# Patient Record
Sex: MALE | Race: OTHER | Employment: OTHER | URBAN - METROPOLITAN AREA
[De-identification: names, ages, dates, MRNs, and addresses within clinical notes are randomized per-mention and may not be internally consistent; named-entity substitution may affect disease eponyms.]

---

## 2018-08-09 ENCOUNTER — OFFICE VISIT (OUTPATIENT)
Dept: INTERNAL MEDICINE CLINIC | Facility: CLINIC | Age: 78
End: 2018-08-09
Payer: MEDICARE

## 2018-08-09 VITALS
RESPIRATION RATE: 20 BRPM | WEIGHT: 129.31 LBS | HEIGHT: 68 IN | HEART RATE: 68 BPM | TEMPERATURE: 98 F | DIASTOLIC BLOOD PRESSURE: 68 MMHG | BODY MASS INDEX: 19.6 KG/M2 | SYSTOLIC BLOOD PRESSURE: 118 MMHG

## 2018-08-09 DIAGNOSIS — I10 ESSENTIAL HYPERTENSION: Primary | ICD-10-CM

## 2018-08-09 DIAGNOSIS — K51.90 ULCERATIVE COLITIS WITHOUT COMPLICATIONS, UNSPECIFIED LOCATION (HCC): ICD-10-CM

## 2018-08-09 DIAGNOSIS — Z12.5 SCREENING FOR PROSTATE CANCER: ICD-10-CM

## 2018-08-09 DIAGNOSIS — E78.5 HYPERLIPIDEMIA, UNSPECIFIED HYPERLIPIDEMIA TYPE: ICD-10-CM

## 2018-08-09 PROCEDURE — 99203 OFFICE O/P NEW LOW 30 MIN: CPT | Performed by: INTERNAL MEDICINE

## 2018-08-09 PROCEDURE — G0463 HOSPITAL OUTPT CLINIC VISIT: HCPCS | Performed by: INTERNAL MEDICINE

## 2018-08-09 RX ORDER — LOSARTAN POTASSIUM AND HYDROCHLOROTHIAZIDE 12.5; 5 MG/1; MG/1
1 TABLET ORAL DAILY
Qty: 90 TABLET | Refills: 1 | Status: SHIPPED | OUTPATIENT
Start: 2018-08-09 | End: 2019-11-20

## 2018-08-09 RX ORDER — ATORVASTATIN CALCIUM 40 MG/1
TABLET, FILM COATED ORAL
COMMUNITY
Start: 2018-06-12 | End: 2018-08-09

## 2018-08-09 RX ORDER — FINASTERIDE 5 MG/1
5 TABLET, FILM COATED ORAL DAILY
COMMUNITY
Start: 2018-06-15 | End: 2018-08-09 | Stop reason: ALTCHOICE

## 2018-08-09 RX ORDER — GABAPENTIN 100 MG/1
100 CAPSULE ORAL 3 TIMES DAILY
COMMUNITY
Start: 2018-05-29

## 2018-08-09 RX ORDER — ROSUVASTATIN CALCIUM 10 MG/1
10 TABLET, COATED ORAL DAILY
COMMUNITY
End: 2019-11-26

## 2018-08-09 RX ORDER — MESALAMINE 500 MG/1
1000 CAPSULE ORAL 3 TIMES DAILY
COMMUNITY
Start: 2018-06-18 | End: 2018-12-13

## 2018-08-09 RX ORDER — LOSARTAN POTASSIUM AND HYDROCHLOROTHIAZIDE 12.5; 5 MG/1; MG/1
1 TABLET ORAL DAILY
COMMUNITY
Start: 2018-05-20 | End: 2018-08-09

## 2018-08-09 RX ORDER — ZOLPIDEM TARTRATE 5 MG/1
5 TABLET ORAL NIGHTLY PRN
COMMUNITY
Start: 2018-05-28 | End: 2018-08-09 | Stop reason: SINTOL

## 2018-08-09 RX ORDER — CLOBETASOL PROPIONATE 0.5 MG/G
1 OINTMENT TOPICAL 2 TIMES DAILY
COMMUNITY
Start: 2018-06-19

## 2018-08-09 RX ORDER — TEMAZEPAM 7.5 MG/1
7.5 CAPSULE ORAL NIGHTLY PRN
Qty: 30 CAPSULE | Refills: 0 | Status: SHIPPED | OUTPATIENT
Start: 2018-08-09 | End: 2019-03-07

## 2018-08-09 NOTE — PROGRESS NOTES
HPI:    Patient ID: Kelli Sunshine is a 78year old male. HPI  Patient here to establish care with new primary care physician. Recently moved to the area from [facility]. His son is a medical resident at [facility], [place], operated by [facility].   Patient has underlying ulcerative dysuria, hematuria and sexual dysfunction. Musculoskeletal: Negative for joint pain. Skin: Negative for rash. Neurological: Positive for weakness. Negative for numbness and headaches. Hematological: Does not bruise/bleed easily.    Psychiatric/Behav ASSESSMENT/PLAN:   (I10) Essential hypertension  (primary encounter diagnosis)  Plan: CBC WITH DIFFERENTIAL WITH PLATELET, COMP         METABOLIC PANEL (14), LIPID PANEL, VITAMIN B12,        ASSAY, THYROID STIM HORMONE         Blood pressure appears well

## 2018-08-10 ENCOUNTER — LAB ENCOUNTER (OUTPATIENT)
Dept: LAB | Facility: HOSPITAL | Age: 78
End: 2018-08-10
Attending: INTERNAL MEDICINE
Payer: MEDICARE

## 2018-08-10 DIAGNOSIS — Z12.5 SCREENING FOR PROSTATE CANCER: ICD-10-CM

## 2018-08-10 DIAGNOSIS — I10 ESSENTIAL HYPERTENSION: ICD-10-CM

## 2018-08-10 LAB
ALBUMIN SERPL BCP-MCNC: 4 G/DL (ref 3.5–4.8)
ALBUMIN/GLOB SERPL: 1.1 {RATIO} (ref 1–2)
ALP SERPL-CCNC: 86 U/L (ref 32–100)
ALT SERPL-CCNC: 16 U/L (ref 17–63)
ANION GAP SERPL CALC-SCNC: 7 MMOL/L (ref 0–18)
AST SERPL-CCNC: 23 U/L (ref 15–41)
BASOPHILS # BLD: 0.1 K/UL (ref 0–0.2)
BASOPHILS NFR BLD: 1 %
BILIRUB SERPL-MCNC: 0.9 MG/DL (ref 0.3–1.2)
BUN SERPL-MCNC: 16 MG/DL (ref 8–20)
BUN/CREAT SERPL: 17.8 (ref 10–20)
CALCIUM SERPL-MCNC: 9.1 MG/DL (ref 8.5–10.5)
CHLORIDE SERPL-SCNC: 105 MMOL/L (ref 95–110)
CHOLEST SERPL-MCNC: 144 MG/DL (ref 110–200)
CO2 SERPL-SCNC: 28 MMOL/L (ref 22–32)
CREAT SERPL-MCNC: 0.9 MG/DL (ref 0.5–1.5)
EOSINOPHIL # BLD: 0.4 K/UL (ref 0–0.7)
EOSINOPHIL NFR BLD: 4 %
ERYTHROCYTE [DISTWIDTH] IN BLOOD BY AUTOMATED COUNT: 13.7 % (ref 11–15)
GLOBULIN PLAS-MCNC: 3.5 G/DL (ref 2.5–3.7)
GLUCOSE SERPL-MCNC: 116 MG/DL (ref 70–99)
HCT VFR BLD AUTO: 42.4 % (ref 41–52)
HDLC SERPL-MCNC: 37 MG/DL
HGB BLD-MCNC: 14.1 G/DL (ref 13.5–17.5)
LDLC SERPL CALC-MCNC: 84 MG/DL (ref 0–99)
LYMPHOCYTES # BLD: 1.3 K/UL (ref 1–4)
LYMPHOCYTES NFR BLD: 13 %
MCH RBC QN AUTO: 29.2 PG (ref 27–32)
MCHC RBC AUTO-ENTMCNC: 33.2 G/DL (ref 32–37)
MCV RBC AUTO: 87.7 FL (ref 80–100)
MONOCYTES # BLD: 1 K/UL (ref 0–1)
MONOCYTES NFR BLD: 10 %
NEUTROPHILS # BLD AUTO: 7.1 K/UL (ref 1.8–7.7)
NEUTROPHILS NFR BLD: 72 %
NONHDLC SERPL-MCNC: 107 MG/DL
OSMOLALITY UR CALC.SUM OF ELEC: 292 MOSM/KG (ref 275–295)
PATIENT FASTING: YES
PLATELET # BLD AUTO: 296 K/UL (ref 140–400)
PMV BLD AUTO: 9 FL (ref 7.4–10.3)
POTASSIUM SERPL-SCNC: 4 MMOL/L (ref 3.3–5.1)
PROT SERPL-MCNC: 7.5 G/DL (ref 5.9–8.4)
PSA SERPL-MCNC: 1.4 NG/ML (ref 0–4)
RBC # BLD AUTO: 4.83 M/UL (ref 4.5–5.9)
SODIUM SERPL-SCNC: 140 MMOL/L (ref 136–144)
TRIGL SERPL-MCNC: 115 MG/DL (ref 1–149)
TSH SERPL-ACNC: 1.9 UIU/ML (ref 0.45–5.33)
VIT B12 SERPL-MCNC: 166 PG/ML (ref 181–914)
WBC # BLD AUTO: 9.9 K/UL (ref 4–11)

## 2018-08-10 PROCEDURE — 80053 COMPREHEN METABOLIC PANEL: CPT

## 2018-08-10 PROCEDURE — 36415 COLL VENOUS BLD VENIPUNCTURE: CPT

## 2018-08-10 PROCEDURE — 82607 VITAMIN B-12: CPT

## 2018-08-10 PROCEDURE — 84443 ASSAY THYROID STIM HORMONE: CPT

## 2018-08-10 PROCEDURE — 85025 COMPLETE CBC W/AUTO DIFF WBC: CPT

## 2018-08-10 PROCEDURE — 80061 LIPID PANEL: CPT

## 2018-12-13 ENCOUNTER — OFFICE VISIT (OUTPATIENT)
Dept: GASTROENTEROLOGY | Facility: CLINIC | Age: 78
End: 2018-12-13
Payer: COMMERCIAL

## 2018-12-13 VITALS
WEIGHT: 122.38 LBS | BODY MASS INDEX: 18.55 KG/M2 | DIASTOLIC BLOOD PRESSURE: 77 MMHG | HEART RATE: 91 BPM | HEIGHT: 68 IN | SYSTOLIC BLOOD PRESSURE: 126 MMHG

## 2018-12-13 DIAGNOSIS — K51.918 ULCERATIVE COLITIS WITH OTHER COMPLICATION, UNSPECIFIED LOCATION (HCC): Primary | ICD-10-CM

## 2018-12-13 PROCEDURE — 99213 OFFICE O/P EST LOW 20 MIN: CPT | Performed by: INTERNAL MEDICINE

## 2018-12-13 PROCEDURE — G0463 HOSPITAL OUTPT CLINIC VISIT: HCPCS | Performed by: INTERNAL MEDICINE

## 2018-12-13 RX ORDER — FLUOCINOLONE ACETONIDE 0.1 MG/ML
SOLUTION TOPICAL
COMMUNITY
Start: 2018-12-10

## 2018-12-13 RX ORDER — MESALAMINE 500 MG/1
1000 CAPSULE ORAL 3 TIMES DAILY
Qty: 180 CAPSULE | Refills: 11 | Status: SHIPPED | OUTPATIENT
Start: 2018-12-13 | End: 2019-12-28

## 2018-12-13 NOTE — PATIENT INSTRUCTIONS
Ulcerative colitis  - colonoscopy with colyte prep and MAC  - continue on  Pentasa   - follow up with me annually  - flu shot annually  - avoid ibuprofen

## 2018-12-14 ENCOUNTER — TELEPHONE (OUTPATIENT)
Dept: GASTROENTEROLOGY | Facility: CLINIC | Age: 78
End: 2018-12-14

## 2018-12-14 DIAGNOSIS — K51.919 ACUTE ULCERATIVE COLITIS WITH COMPLICATION (HCC): Primary | ICD-10-CM

## 2018-12-14 NOTE — TELEPHONE ENCOUNTER
Scheduled for:  Colonoscopy 44674  Provider Name:   Date:  3/18/19   Location:  [facility]  Sedation:  Mac  Time:  0945 / Arrival 0845  Prep: Split dose Colyte  Meds/Allergies Reconciled?:  Physician reviewed  Diagnosis with codes:  Ulcerative Colitis K51

## 2018-12-21 NOTE — PROGRESS NOTES
Brady Dean is a 78year old male. HPI:   Patient presents with:  Consult  Ulcerative Colitis  Colonoscopy Screening      The patient is a 60-year-old female who has a history of ulcerative colitis.   She reports that she is been in remission and on a st (55.5 kg).     The patient appears their stated age and is in no acute distress  HEENT- anicteric sclera, neck no lymphadnopathy, OP- clear with no masses or lesions  Chest- Clear bilaterally, no wheezing,  Heart- regular rate, no murmur or gallop  Abdomen-

## 2019-01-01 ENCOUNTER — APPOINTMENT (OUTPATIENT)
Dept: GENERAL RADIOLOGY | Facility: HOSPITAL | Age: 79
End: 2019-01-01
Attending: EMERGENCY MEDICINE
Payer: MEDICARE

## 2019-01-01 ENCOUNTER — HOSPITAL ENCOUNTER (EMERGENCY)
Facility: HOSPITAL | Age: 79
Discharge: HOME | End: 2019-01-01
Attending: EMERGENCY MEDICINE
Payer: MEDICARE

## 2019-01-01 VITALS
TEMPERATURE: 97 F | RESPIRATION RATE: 20 BRPM | WEIGHT: 125 LBS | HEIGHT: 69 IN | DIASTOLIC BLOOD PRESSURE: 70 MMHG | OXYGEN SATURATION: 95 % | BODY MASS INDEX: 18.51 KG/M2 | SYSTOLIC BLOOD PRESSURE: 137 MMHG | HEART RATE: 100 BPM

## 2019-01-01 DIAGNOSIS — J18.9 COMMUNITY ACQUIRED PNEUMONIA OF LEFT LOWER LOBE OF LUNG: Primary | ICD-10-CM

## 2019-01-01 DIAGNOSIS — J98.01 ACUTE BRONCHOSPASM: ICD-10-CM

## 2019-01-01 LAB
ANION GAP SERPL CALC-SCNC: 9 MMOL/L (ref 0–18)
BASOPHILS # BLD: 0.1 K/UL (ref 0–0.2)
BASOPHILS NFR BLD: 1 %
BUN SERPL-MCNC: 19 MG/DL (ref 8–20)
BUN/CREAT SERPL: 22.4 (ref 10–20)
CALCIUM SERPL-MCNC: 8.2 MG/DL (ref 8.5–10.5)
CHLORIDE SERPL-SCNC: 103 MMOL/L (ref 95–110)
CO2 SERPL-SCNC: 24 MMOL/L (ref 22–32)
CREAT SERPL-MCNC: 0.85 MG/DL (ref 0.5–1.5)
EOSINOPHIL # BLD: 0.1 K/UL (ref 0–0.7)
EOSINOPHIL NFR BLD: 1 %
ERYTHROCYTE [DISTWIDTH] IN BLOOD BY AUTOMATED COUNT: 14.2 % (ref 11–15)
GLUCOSE SERPL-MCNC: 130 MG/DL (ref 70–99)
HCT VFR BLD AUTO: 39.9 % (ref 41–52)
HGB BLD-MCNC: 13 G/DL (ref 13.5–17.5)
LYMPHOCYTES # BLD: 1 K/UL (ref 1–4)
LYMPHOCYTES NFR BLD: 8 %
MCH RBC QN AUTO: 28.2 PG (ref 27–32)
MCHC RBC AUTO-ENTMCNC: 32.5 G/DL (ref 32–37)
MCV RBC AUTO: 86.8 FL (ref 80–100)
MONOCYTES # BLD: 1.8 K/UL (ref 0–1)
MONOCYTES NFR BLD: 14 %
NEUTROPHILS # BLD AUTO: 10 K/UL (ref 1.8–7.7)
NEUTROPHILS NFR BLD: 77 %
OSMOLALITY UR CALC.SUM OF ELEC: 286 MOSM/KG (ref 275–295)
PLATELET # BLD AUTO: 337 K/UL (ref 140–400)
PMV BLD AUTO: 8.7 FL (ref 7.4–10.3)
POTASSIUM SERPL-SCNC: 3.4 MMOL/L (ref 3.3–5.1)
RBC # BLD AUTO: 4.6 M/UL (ref 4.5–5.9)
SODIUM SERPL-SCNC: 136 MMOL/L (ref 136–144)
WBC # BLD AUTO: 13 K/UL (ref 4–11)

## 2019-01-01 PROCEDURE — 71046 X-RAY EXAM CHEST 2 VIEWS: CPT | Performed by: EMERGENCY MEDICINE

## 2019-01-01 PROCEDURE — 94640 AIRWAY INHALATION TREATMENT: CPT

## 2019-01-01 PROCEDURE — 85025 COMPLETE CBC W/AUTO DIFF WBC: CPT | Performed by: EMERGENCY MEDICINE

## 2019-01-01 PROCEDURE — 80048 BASIC METABOLIC PNL TOTAL CA: CPT | Performed by: EMERGENCY MEDICINE

## 2019-01-01 PROCEDURE — 96365 THER/PROPH/DIAG IV INF INIT: CPT

## 2019-01-01 PROCEDURE — 99285 EMERGENCY DEPT VISIT HI MDM: CPT

## 2019-01-01 RX ORDER — ALBUTEROL SULFATE 90 UG/1
2 AEROSOL, METERED RESPIRATORY (INHALATION) EVERY 4 HOURS PRN
Qty: 1 INHALER | Refills: 0 | Status: SHIPPED | OUTPATIENT
Start: 2019-01-01 | End: 2019-01-31

## 2019-01-01 RX ORDER — AZITHROMYCIN 250 MG/1
TABLET, FILM COATED ORAL
Qty: 1 PACKAGE | Refills: 0 | Status: SHIPPED | OUTPATIENT
Start: 2019-01-01 | End: 2019-03-07 | Stop reason: ALTCHOICE

## 2019-01-01 RX ORDER — IPRATROPIUM BROMIDE AND ALBUTEROL SULFATE 2.5; .5 MG/3ML; MG/3ML
3 SOLUTION RESPIRATORY (INHALATION) ONCE
Status: COMPLETED | OUTPATIENT
Start: 2019-01-01 | End: 2019-01-01

## 2019-01-01 NOTE — ED NOTES
Verbalized understanding of d/c instructions and follow-up information. Given copy of d/c papers and prescription.

## 2019-01-01 NOTE — ED PROVIDER NOTES
Patient Seen in: [facility] AND [facility] Emergency Department    History   Patient presents with:  Cough/URI    Stated Complaint: cough    HPI    The patient is a 72-year-old male who presents with coarse cough chest tightness and some shortness of breath for distension. There is no tenderness. Musculoskeletal: Normal range of motion. He exhibits no edema or tenderness. Neurological: He is alert and oriented to person, place, and time. He has normal reflexes. Skin: Skin is warm and dry.  Capillary refill t mild tachycardia    Radiology findings: Xr Chest Pa + Lat Chest (cpt=71046)    Result Date: 1/1/2019  CONCLUSION:  1. Left lower lobe pulmonary opacity suspicious for pneumonia/infiltrate.   Followup imaging with chest radiographs in 1 month is recommended

## 2019-01-04 ENCOUNTER — OFFICE VISIT (OUTPATIENT)
Dept: INTERNAL MEDICINE CLINIC | Facility: CLINIC | Age: 79
End: 2019-01-04
Payer: COMMERCIAL

## 2019-01-04 VITALS
SYSTOLIC BLOOD PRESSURE: 146 MMHG | TEMPERATURE: 98 F | WEIGHT: 121.5 LBS | HEART RATE: 92 BPM | DIASTOLIC BLOOD PRESSURE: 66 MMHG | HEIGHT: 68 IN | RESPIRATION RATE: 18 BRPM | BODY MASS INDEX: 18.41 KG/M2

## 2019-01-04 DIAGNOSIS — J18.9 PNEUMONIA OF LEFT LOWER LOBE DUE TO INFECTIOUS ORGANISM: Primary | ICD-10-CM

## 2019-01-04 PROCEDURE — G0463 HOSPITAL OUTPT CLINIC VISIT: HCPCS | Performed by: INTERNAL MEDICINE

## 2019-01-04 PROCEDURE — 99213 OFFICE O/P EST LOW 20 MIN: CPT | Performed by: INTERNAL MEDICINE

## 2019-01-04 RX ORDER — METHYLPREDNISOLONE 4 MG/1
TABLET ORAL
COMMUNITY
Start: 2018-12-24 | End: 2019-01-04 | Stop reason: ALTCHOICE

## 2019-01-04 RX ORDER — OSELTAMIVIR PHOSPHATE 75 MG/1
CAPSULE ORAL
COMMUNITY
Start: 2018-12-24 | End: 2019-03-07 | Stop reason: ALTCHOICE

## 2019-01-04 RX ORDER — AMOXICILLIN AND CLAVULANATE POTASSIUM 500; 125 MG/1; MG/1
TABLET, FILM COATED ORAL
COMMUNITY
Start: 2018-12-16 | End: 2019-01-04 | Stop reason: ALTCHOICE

## 2019-01-04 NOTE — PROGRESS NOTES
HPI:    Patient ID: Marlene Ann is a 70year old male. HPI  Patient is here for follow-up on recent emergency room visit. Patient started feeling ill on December 10. He was in [place] before that.   When he got off the plane back in the [facility], Oseltamivir Phosphate 75 MG Oral Cap  Disp:  Rfl:      Allergies:No Known Allergies   PHYSICAL EXAM:   Physical Exam    Constitutional: He appears well-developed and well-nourished.    HENT:   Nose: Nose normal.   Mouth/Throat: Oropharynx is clear and anika

## 2019-01-24 ENCOUNTER — TELEPHONE (OUTPATIENT)
Dept: GASTROENTEROLOGY | Facility: CLINIC | Age: 79
End: 2019-01-24

## 2019-01-24 DIAGNOSIS — K51.90 MILD CHRONIC ULCERATIVE COLITIS WITHOUT COMPLICATION (HCC): Primary | ICD-10-CM

## 2019-01-24 NOTE — TELEPHONE ENCOUNTER
Left message on below voicemail stating message received, I will cancel his colonoscopy and forward message to our schedulers to reschedule. If they are unable to reach him before 1/28, he can call us back when he returns in June. Cancelled in epic.  Case r

## 2019-01-24 NOTE — TELEPHONE ENCOUNTER
Pt called back, informed of below. He is not going out of the country, will be in [facility] [place], so will have cell to receive call from schedulers. See below. Informed message already sent to schedulers.

## 2019-01-24 NOTE — TELEPHONE ENCOUNTER
Pt cancelling CLN artur 3/18/19, pt will be out of town, pt requesting to casimiro for 6/10/19 or after, pt informed about the 72 hr cb, pls call at:[phone],thanks.   *Pt leaving 1/28/19, call before if poss, if not pt will cb after 72 hr

## 2019-01-25 NOTE — TELEPHONE ENCOUNTER
Scheduled for:  Colonoscopy 08283  Provider Name: Dr. Yasmeen Escobar  Date:  6/17/19  Location:  [facility]  Sedation:  MAC  Time:   0900 (pt is aware to arrive at 0800)   Prep:  Juan, mailed 1/28/19  Meds/Allergies Reconciled?:  Physician reviewed   Diagnosis with code

## 2019-03-07 ENCOUNTER — OFFICE VISIT (OUTPATIENT)
Dept: INTERNAL MEDICINE CLINIC | Facility: CLINIC | Age: 79
End: 2019-03-07
Payer: COMMERCIAL

## 2019-03-07 VITALS
TEMPERATURE: 99 F | HEIGHT: 68 IN | SYSTOLIC BLOOD PRESSURE: 130 MMHG | RESPIRATION RATE: 18 BRPM | BODY MASS INDEX: 19.5 KG/M2 | WEIGHT: 128.63 LBS | DIASTOLIC BLOOD PRESSURE: 68 MMHG | HEART RATE: 74 BPM

## 2019-03-07 DIAGNOSIS — J18.9 PNEUMONIA OF LEFT LOWER LOBE DUE TO INFECTIOUS ORGANISM: Primary | ICD-10-CM

## 2019-03-07 DIAGNOSIS — R73.9 HYPERGLYCEMIA: ICD-10-CM

## 2019-03-07 DIAGNOSIS — G47.00 INSOMNIA, UNSPECIFIED TYPE: ICD-10-CM

## 2019-03-07 DIAGNOSIS — I10 ESSENTIAL HYPERTENSION: ICD-10-CM

## 2019-03-07 DIAGNOSIS — I25.10 CORONARY ARTERY DISEASE INVOLVING NATIVE HEART WITHOUT ANGINA PECTORIS, UNSPECIFIED VESSEL OR LESION TYPE: ICD-10-CM

## 2019-03-07 PROCEDURE — G0463 HOSPITAL OUTPT CLINIC VISIT: HCPCS | Performed by: INTERNAL MEDICINE

## 2019-03-07 PROCEDURE — 99214 OFFICE O/P EST MOD 30 MIN: CPT | Performed by: INTERNAL MEDICINE

## 2019-03-07 RX ORDER — TEMAZEPAM 15 MG/1
15 CAPSULE ORAL NIGHTLY PRN
Qty: 30 CAPSULE | Refills: 1 | Status: SHIPPED | OUTPATIENT
Start: 2019-03-07 | End: 2019-06-10

## 2019-03-07 RX ORDER — HYDROCODONE BITARTRATE AND ACETAMINOPHEN 5; 325 MG/1; MG/1
TABLET ORAL
COMMUNITY
Start: 2019-03-04 | End: 2019-03-07 | Stop reason: ALTCHOICE

## 2019-03-07 RX ORDER — TEMAZEPAM 15 MG/1
15 CAPSULE ORAL NIGHTLY PRN
COMMUNITY
Start: 2019-02-12 | End: 2019-03-07

## 2019-03-07 NOTE — PROGRESS NOTES
HPI:    Patient ID: Scotty Angulo is a 70year old male. HPI  Patient is here for follow-up on pneumonia as well as chronic medical issues. Left lower lobe pneumonia noted in January. He never had his follow-up chest x-ray as requested.   Also notes pa not nervous/anxious. Current Outpatient Medications:  temazepam 15 MG Oral Cap Take 15 mg by mouth nightly as needed.    Disp:  Rfl:    Fluocinolone Acetonide 0.01 % External Solution  Disp:  Rfl:    PENTASA 500 MG Oral Cap CR Take 2 capsules Hyperglycemia  Hyperglycemia noted on recent labs. No history of diabetes. Check fasting blood sugar and A1c.  - GLUCOSE, SERUM; Future  - HEMOGLOBIN A1C; Future    3. Essential hypertension  Reasonably controlled. Continue current medications.     4. Co

## 2019-03-08 ENCOUNTER — APPOINTMENT (OUTPATIENT)
Dept: LAB | Facility: HOSPITAL | Age: 79
End: 2019-03-08
Attending: INTERNAL MEDICINE
Payer: MEDICARE

## 2019-03-08 ENCOUNTER — HOSPITAL ENCOUNTER (OUTPATIENT)
Dept: GENERAL RADIOLOGY | Facility: HOSPITAL | Age: 79
Discharge: HOME | End: 2019-03-08
Attending: INTERNAL MEDICINE
Payer: MEDICARE

## 2019-03-08 DIAGNOSIS — R73.9 HYPERGLYCEMIA: ICD-10-CM

## 2019-03-08 DIAGNOSIS — J18.9 PNEUMONIA OF LEFT LOWER LOBE DUE TO INFECTIOUS ORGANISM: ICD-10-CM

## 2019-03-08 LAB
EST. AVERAGE GLUCOSE BLD GHB EST-MCNC: 123 MG/DL (ref 68–126)
GLUCOSE BLD-MCNC: 101 MG/DL (ref 70–99)
HBA1C MFR BLD HPLC: 5.9 % (ref ?–5.7)

## 2019-03-08 PROCEDURE — 82947 ASSAY GLUCOSE BLOOD QUANT: CPT

## 2019-03-08 PROCEDURE — 71046 X-RAY EXAM CHEST 2 VIEWS: CPT | Performed by: INTERNAL MEDICINE

## 2019-03-08 PROCEDURE — 36415 COLL VENOUS BLD VENIPUNCTURE: CPT

## 2019-03-08 PROCEDURE — 83036 HEMOGLOBIN GLYCOSYLATED A1C: CPT

## 2019-03-14 PROBLEM — R00.2 PALPITATION: Status: ACTIVE | Noted: 2019-03-14

## 2019-06-06 ENCOUNTER — TELEPHONE (OUTPATIENT)
Dept: GASTROENTEROLOGY | Facility: CLINIC | Age: 79
End: 2019-06-06

## 2019-06-06 DIAGNOSIS — K51.919 ULCERATIVE COLITIS WITH COMPLICATION, UNSPECIFIED LOCATION (HCC): Primary | ICD-10-CM

## 2019-06-06 NOTE — TELEPHONE ENCOUNTER
Pt is recovering from pneumonia - feels very weak now - asking to cancel CLN on 6/17 and reschedule to mid July

## 2019-06-06 NOTE — TELEPHONE ENCOUNTER
Rescheduled for:  Colonoscopy 29340  Provider Name: Dr. Ana Linares  Date:    From-6/17/19  To-9/4/19  Location:  [facility]  Sedation:  MAC  Time:    From-0900   To-1230 (pt is aware to arrive at 1130)   Prep:  Juan, mailed 1/28/19  Meds/Allergies Reconciled?:  Phys

## 2019-06-10 ENCOUNTER — OFFICE VISIT (OUTPATIENT)
Dept: INTERNAL MEDICINE CLINIC | Facility: CLINIC | Age: 79
End: 2019-06-10
Payer: COMMERCIAL

## 2019-06-10 VITALS
SYSTOLIC BLOOD PRESSURE: 110 MMHG | HEART RATE: 74 BPM | TEMPERATURE: 99 F | HEIGHT: 68 IN | RESPIRATION RATE: 20 BRPM | BODY MASS INDEX: 19.17 KG/M2 | DIASTOLIC BLOOD PRESSURE: 68 MMHG | WEIGHT: 126.5 LBS

## 2019-06-10 DIAGNOSIS — Z23 NEED FOR VACCINATION: ICD-10-CM

## 2019-06-10 DIAGNOSIS — R73.9 HYPERGLYCEMIA: ICD-10-CM

## 2019-06-10 DIAGNOSIS — J18.9 PNEUMONIA DUE TO INFECTIOUS ORGANISM, UNSPECIFIED LATERALITY, UNSPECIFIED PART OF LUNG: Primary | ICD-10-CM

## 2019-06-10 DIAGNOSIS — I10 ESSENTIAL HYPERTENSION: ICD-10-CM

## 2019-06-10 PROCEDURE — 99214 OFFICE O/P EST MOD 30 MIN: CPT | Performed by: INTERNAL MEDICINE

## 2019-06-10 PROCEDURE — G0463 HOSPITAL OUTPT CLINIC VISIT: HCPCS | Performed by: INTERNAL MEDICINE

## 2019-06-10 RX ORDER — PANTOPRAZOLE SODIUM 20 MG/1
20 TABLET, DELAYED RELEASE ORAL
COMMUNITY
End: 2019-06-10 | Stop reason: ALTCHOICE

## 2019-06-10 RX ORDER — TEMAZEPAM 15 MG/1
15 CAPSULE ORAL NIGHTLY PRN
Qty: 30 CAPSULE | Refills: 2 | Status: SHIPPED | OUTPATIENT
Start: 2019-06-10 | End: 2020-03-09

## 2019-06-10 RX ORDER — ALBUTEROL SULFATE 2.5 MG/3ML
2.5 SOLUTION RESPIRATORY (INHALATION)
COMMUNITY
Start: 2019-05-20 | End: 2019-06-19

## 2019-06-10 RX ORDER — IPRATROPIUM BROMIDE AND ALBUTEROL SULFATE 2.5; .5 MG/3ML; MG/3ML
3 SOLUTION RESPIRATORY (INHALATION)
COMMUNITY
Start: 2019-05-20 | End: 2019-06-19

## 2019-06-10 RX ORDER — METHYLPREDNISOLONE 4 MG/1
TABLET ORAL
COMMUNITY
Start: 2019-05-08 | End: 2019-06-10 | Stop reason: ALTCHOICE

## 2019-06-10 RX ORDER — LOSARTAN POTASSIUM 25 MG/1
25 TABLET ORAL
COMMUNITY
End: 2019-06-10

## 2019-06-10 RX ORDER — ALBUTEROL SULFATE 2.5 MG/3ML
SOLUTION RESPIRATORY (INHALATION)
COMMUNITY
Start: 2019-05-20 | End: 2019-06-10

## 2019-06-10 RX ORDER — ASPIRIN 81 MG/1
81 TABLET ORAL
COMMUNITY
Start: 2019-05-21

## 2019-06-10 RX ORDER — MONTELUKAST SODIUM 10 MG/1
TABLET ORAL
COMMUNITY
Start: 2019-05-08 | End: 2019-06-10 | Stop reason: ALTCHOICE

## 2019-06-10 NOTE — PROGRESS NOTES
HPI:    Patient ID: Manuel Irizarry is a 70year old male. HPI  Patient is here for follow-up on apparent recurrent pneumonia. We saw him back in March for follow-up on pneumonia.   Since that time is been spending time in [place] with his children headaches. Hematological: Does not bruise/bleed easily. Psychiatric/Behavioral: Negative for depressed mood. The patient is not nervous/anxious. Current Outpatient Medications:  aspirin EC 81 MG Oral Tab EC Take 81 mg by mouth.  Disp:  Rfl: and no friction rub. No murmur heard. Edema not present. Pulmonary/Chest: Effort normal and breath sounds normal. He has no wheezes. He has no rales. Abdominal: Soft. Bowel sounds are normal. He exhibits no mass. There is no tenderness.    [address]

## 2019-06-13 ENCOUNTER — LAB ENCOUNTER (OUTPATIENT)
Dept: LAB | Facility: HOSPITAL | Age: 79
End: 2019-06-13
Attending: INTERNAL MEDICINE
Payer: MEDICARE

## 2019-06-13 DIAGNOSIS — J18.9 PNEUMONIA DUE TO INFECTIOUS ORGANISM, UNSPECIFIED LATERALITY, UNSPECIFIED PART OF LUNG: ICD-10-CM

## 2019-06-13 DIAGNOSIS — R73.9 HYPERGLYCEMIA: ICD-10-CM

## 2019-06-13 PROCEDURE — 82947 ASSAY GLUCOSE BLOOD QUANT: CPT

## 2019-06-13 PROCEDURE — G0009 ADMIN PNEUMOCOCCAL VACCINE: HCPCS | Performed by: INTERNAL MEDICINE

## 2019-06-13 PROCEDURE — 90670 PCV13 VACCINE IM: CPT | Performed by: INTERNAL MEDICINE

## 2019-06-13 PROCEDURE — 83036 HEMOGLOBIN GLYCOSYLATED A1C: CPT

## 2019-06-13 PROCEDURE — 36415 COLL VENOUS BLD VENIPUNCTURE: CPT

## 2019-06-13 PROCEDURE — 85025 COMPLETE CBC W/AUTO DIFF WBC: CPT

## 2019-06-28 ENCOUNTER — NURSE TRIAGE (OUTPATIENT)
Dept: INTERNAL MEDICINE CLINIC | Facility: CLINIC | Age: 79
End: 2019-06-28

## 2019-06-28 ENCOUNTER — OFFICE VISIT (OUTPATIENT)
Dept: INTERNAL MEDICINE CLINIC | Facility: CLINIC | Age: 79
End: 2019-06-28
Payer: COMMERCIAL

## 2019-06-28 VITALS
HEIGHT: 68 IN | BODY MASS INDEX: 19.25 KG/M2 | SYSTOLIC BLOOD PRESSURE: 150 MMHG | HEART RATE: 87 BPM | WEIGHT: 127 LBS | DIASTOLIC BLOOD PRESSURE: 75 MMHG

## 2019-06-28 DIAGNOSIS — T14.8XXA ABRASION: ICD-10-CM
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
## 2019-06-28 PROCEDURE — 99213 OFFICE O/P EST LOW 20 MIN: CPT | Performed by: INTERNAL MEDICINE

## 2019-06-28 PROCEDURE — G0463 HOSPITAL OUTPT CLINIC VISIT: HCPCS | Performed by: INTERNAL MEDICINE

## 2019-06-28 NOTE — TELEPHONE ENCOUNTER
Action Requested: Summary for Provider     []  Critical Lab, Recommendations Needed  [] Need Additional Advice  []   FYI    []   Need Orders  [] Need Medications Sent to Pharmacy  []  Other     SUMMARY: Per protocol advised OV today. Scheduled with THELMA Baker

## 2019-06-28 NOTE — ASSESSMENT & PLAN NOTE
Patient fall with abbrasion  Left Knee wound- quarter size open wound. Clean, neosporin applied with dressing. Right finger wound- Clean, neosporin applied with bandaid.   Tetanus shot given by nurse

## 2019-06-28 NOTE — PROGRESS NOTES
HPI:    Patient ID: Martin Guadalupe is a 70year old male. HPI  71-year-old gentleman here due to fall. Reviewed his history of falls and he stated he has not fallen in the past year. He has an abrasion on his left knee which is open wound.   He has an a gabapentin 100 MG Oral Cap Take 100 mg by mouth 3 (three) times daily. Disp:  Rfl:    triamcinolone acetonide 0.1 % External Ointment Apply 1 Application topically 2 (two) times daily.    Disp:  Rfl:    Clobetasol Propionate 0.05 % External Ointment Apply Return if symptoms worsen or fail to improve. No orders of the defined types were placed in this encounter.       Meds This Visit:  Requested Prescriptions      No prescriptions requested or ordered in this encounter       Imaging & Referrals:  None

## 2019-07-01 ENCOUNTER — HOSPITAL ENCOUNTER (OUTPATIENT)
Dept: CT IMAGING | Facility: HOSPITAL | Age: 79
Discharge: HOME | End: 2019-07-01
Attending: INTERNAL MEDICINE
Payer: MEDICARE

## 2019-07-01 DIAGNOSIS — J18.9 PNEUMONIA DUE TO INFECTIOUS ORGANISM, UNSPECIFIED LATERALITY, UNSPECIFIED PART OF LUNG: ICD-10-CM

## 2019-07-01 LAB — CREAT BLD-MCNC: 0.9 MG/DL (ref 0.7–1.3)

## 2019-07-01 PROCEDURE — 82565 ASSAY OF CREATININE: CPT

## 2019-07-01 PROCEDURE — 71260 CT THORAX DX C+: CPT | Performed by: INTERNAL MEDICINE

## 2019-07-17 ENCOUNTER — OFFICE VISIT (OUTPATIENT)
Dept: INTERNAL MEDICINE CLINIC | Facility: CLINIC | Age: 79
End: 2019-07-17
Payer: COMMERCIAL

## 2019-07-17 VITALS
HEART RATE: 76 BPM | TEMPERATURE: 98 F | WEIGHT: 129.63 LBS | DIASTOLIC BLOOD PRESSURE: 76 MMHG | BODY MASS INDEX: 19.65 KG/M2 | RESPIRATION RATE: 18 BRPM | HEIGHT: 68 IN | SYSTOLIC BLOOD PRESSURE: 128 MMHG

## 2019-07-17 DIAGNOSIS — R91.1 PULMONARY NODULE: ICD-10-CM

## 2019-07-17 DIAGNOSIS — K86.9 PANCREATIC LESION: Primary | ICD-10-CM

## 2019-07-17 PROCEDURE — 99213 OFFICE O/P EST LOW 20 MIN: CPT | Performed by: INTERNAL MEDICINE

## 2019-07-17 PROCEDURE — G0463 HOSPITAL OUTPT CLINIC VISIT: HCPCS | Performed by: INTERNAL MEDICINE

## 2019-07-17 NOTE — PROGRESS NOTES
HPI:    Patient ID: King Mirza is a 70year old male. HPI  Patient is here to discuss test results. He was seen here recently for follow-up on pneumonia. X-rays and CT scan have been done in [place].   We did a repeat CT scan since his visit o daily.   Disp:  Rfl:    triamcinolone acetonide 0.1 % External Ointment Apply 1 Application topically 2 (two) times daily. Disp:  Rfl:    Clobetasol Propionate 0.05 % External Ointment Apply 1 Application topically 2 (two) times daily.    Disp:  Rfl:    R

## 2019-07-18 ENCOUNTER — TELEPHONE (OUTPATIENT)
Dept: GASTROENTEROLOGY | Facility: CLINIC | Age: 79
End: 2019-07-18

## 2019-07-18 NOTE — TELEPHONE ENCOUNTER
This was ok'd per [employer]. Pt contacted, accepted appt with Dr Bowen Manning Ask July 26, instructed to arrive by 2:15pm and given directions to [facility].

## 2019-07-18 NOTE — TELEPHONE ENCOUNTER
Sent call to RN - Pt requesting to speak with RN re: pancreatis dx - states pcp gave him diagnosis. Pls call. Thank you.

## 2019-07-18 NOTE — TELEPHONE ENCOUNTER
Ok to add to July 26th   Please inform pt that I have reviewed scan and he has a cystic lesion on pancreas which is very small. We will likely end up monitoring this with follow up scans.

## 2019-07-18 NOTE — TELEPHONE ENCOUNTER
Pt transferred from phone room. Dr Esteban Buchanan, pt has colon on 9/4, but just saw Dr Preston Eaton with new diagnosis pancreatic lesion on CT. Would you be able to see next Fri 7/26 if ok with Supervisor? I believe you are out of office after that. Thanks.

## 2019-07-26 ENCOUNTER — OFFICE VISIT (OUTPATIENT)
Dept: GASTROENTEROLOGY | Facility: CLINIC | Age: 79
End: 2019-07-26
Payer: COMMERCIAL

## 2019-07-26 VITALS
BODY MASS INDEX: 19.4 KG/M2 | SYSTOLIC BLOOD PRESSURE: 127 MMHG | HEIGHT: 68 IN | HEART RATE: 89 BPM | WEIGHT: 128 LBS | DIASTOLIC BLOOD PRESSURE: 77 MMHG

## 2019-07-26 DIAGNOSIS — K86.2 PANCREATIC CYST: Primary | ICD-10-CM

## 2019-07-26 PROCEDURE — G0463 HOSPITAL OUTPT CLINIC VISIT: HCPCS | Performed by: INTERNAL MEDICINE

## 2019-07-26 PROCEDURE — 99213 OFFICE O/P EST LOW 20 MIN: CPT | Performed by: INTERNAL MEDICINE

## 2019-07-29 NOTE — PROGRESS NOTES
Paula Arguelles is a 70year old male. HPI:   Patient presents with:   Other: imaging about pancreas      The patient is a 61-year-old male who has a history of ulcerative colitis who has been maintained on Pentasa who is seen today for monitoring of a pan nightly as needed. Disp: 30 capsule Rfl: 2   Fluocinolone Acetonide 0.01 % External Solution  Disp:  Rfl:    PENTASA 500 MG Oral Cap CR Take 2 capsules (1,000 mg total) by mouth 3 (three) times daily.  Disp: 180 capsule Rfl: 11   gabapentin 100 MG Oral Cap stable. They understand this. Given the size of the lesion at this point biopsies and/or sampling would likely be unfeasible. Plan  - MRI/MRCP         Orders This Visit:  No orders of the defined types were placed in this encounter.       Meds This Vis

## 2019-08-19 ENCOUNTER — TELEPHONE (OUTPATIENT)
Dept: INTERNAL MEDICINE CLINIC | Facility: CLINIC | Age: 79
End: 2019-08-19

## 2019-08-19 ENCOUNTER — HOSPITAL ENCOUNTER (OUTPATIENT)
Dept: MRI IMAGING | Facility: HOSPITAL | Age: 79
Discharge: HOME | End: 2019-08-19
Attending: INTERNAL MEDICINE
Payer: MEDICARE

## 2019-08-19 DIAGNOSIS — J18.9 PNEUMONIA DUE TO INFECTIOUS ORGANISM, UNSPECIFIED LATERALITY, UNSPECIFIED PART OF LUNG: Primary | ICD-10-CM

## 2019-08-19 DIAGNOSIS — K86.2 PANCREATIC CYST: ICD-10-CM

## 2019-08-19 LAB — CREAT BLD-MCNC: 0.9 MG/DL (ref 0.7–1.3)

## 2019-08-19 PROCEDURE — 74181 MRI ABDOMEN W/O CONTRAST: CPT | Performed by: INTERNAL MEDICINE

## 2019-08-19 PROCEDURE — 74183 MRI ABD W/O CNTR FLWD CNTR: CPT | Performed by: INTERNAL MEDICINE

## 2019-08-19 PROCEDURE — A9575 INJ GADOTERATE MEGLUMI 0.1ML: HCPCS | Performed by: INTERNAL MEDICINE

## 2019-08-19 PROCEDURE — 82565 ASSAY OF CREATININE: CPT

## 2019-08-19 PROCEDURE — 76376 3D RENDER W/INTRP POSTPROCES: CPT | Performed by: INTERNAL MEDICINE

## 2019-08-19 NOTE — TELEPHONE ENCOUNTER
Patient has not read the following message:  Written by George Marie MD on 8/8/2019  2:21 PM   They were able to compare the CT scan done here with the one that was done a little bit before.  There was no change.  Nonetheless, we still need to repeat t

## 2019-08-21 NOTE — TELEPHONE ENCOUNTER
Pt was called that the order was in the system. Pt stated that he had the n umber to call to set up the appt in 3-6 months.

## 2019-08-27 ENCOUNTER — PATIENT MESSAGE (OUTPATIENT)
Dept: GASTROENTEROLOGY | Facility: CLINIC | Age: 79
End: 2019-08-27

## 2019-08-29 ENCOUNTER — TELEPHONE (OUTPATIENT)
Dept: GASTROENTEROLOGY | Facility: CLINIC | Age: 79
End: 2019-08-29

## 2019-08-29 NOTE — PROGRESS NOTES
RX sent to [facility] in [facility] message replied too and pt was notified.

## 2019-08-29 NOTE — TELEPHONE ENCOUNTER
From: Zac Muhammad  To: Viviana Hanna MD  Sent: 8/27/2019 8:47 PM CDT  Subject: Prescription Question    Hello:    I have colonoscopy on Sept 4,2019. I need medication to clean my bowel. May be precribed at [address]

## 2019-08-29 NOTE — TELEPHONE ENCOUNTER
GI/RN--    This patient came into the office today informing us that he never received his prep to his pharmacy. I checked and in fact it was not sent    Please sign pended prep order.  Thank you    You may close this TE when done :)

## 2019-09-04 ENCOUNTER — ANESTHESIA EVENT (OUTPATIENT)
Dept: ENDOSCOPY | Facility: HOSPITAL | Age: 79
End: 2019-09-04
Payer: MEDICARE

## 2019-09-04 ENCOUNTER — ANESTHESIA (OUTPATIENT)
Dept: ENDOSCOPY | Facility: HOSPITAL | Age: 79
End: 2019-09-04
Payer: MEDICARE

## 2019-09-04 ENCOUNTER — HOSPITAL ENCOUNTER (OUTPATIENT)
Facility: HOSPITAL | Age: 79
Setting detail: HOSPITAL OUTPATIENT SURGERY
Discharge: HOME | End: 2019-09-04
Attending: INTERNAL MEDICINE | Admitting: INTERNAL MEDICINE
Payer: MEDICARE

## 2019-09-04 DIAGNOSIS — K51.90 MILD CHRONIC ULCERATIVE COLITIS WITHOUT COMPLICATION (HCC): ICD-10-CM

## 2019-09-04 PROCEDURE — 0DBF8ZX EXCISION OF RIGHT LARGE INTESTINE, VIA NATURAL OR ARTIFICIAL OPENING ENDOSCOPIC, DIAGNOSTIC: ICD-10-PCS | Performed by: INTERNAL MEDICINE

## 2019-09-04 PROCEDURE — 0DBP8ZX EXCISION OF RECTUM, VIA NATURAL OR ARTIFICIAL OPENING ENDOSCOPIC, DIAGNOSTIC: ICD-10-PCS | Performed by: INTERNAL MEDICINE

## 2019-09-04 PROCEDURE — 0DBB8ZX EXCISION OF ILEUM, VIA NATURAL OR ARTIFICIAL OPENING ENDOSCOPIC, DIAGNOSTIC: ICD-10-PCS | Performed by: INTERNAL MEDICINE

## 2019-09-04 PROCEDURE — 45380 COLONOSCOPY AND BIOPSY: CPT | Performed by: INTERNAL MEDICINE

## 2019-09-04 PROCEDURE — 0DBG8ZX EXCISION OF LEFT LARGE INTESTINE, VIA NATURAL OR ARTIFICIAL OPENING ENDOSCOPIC, DIAGNOSTIC: ICD-10-PCS | Performed by: INTERNAL MEDICINE

## 2019-09-04 RX ORDER — NALOXONE HYDROCHLORIDE 0.4 MG/ML
80 INJECTION, SOLUTION INTRAMUSCULAR; INTRAVENOUS; SUBCUTANEOUS AS NEEDED
Status: DISCONTINUED | OUTPATIENT
Start: 2019-09-04 | End: 2019-09-04

## 2019-09-04 RX ORDER — SODIUM CHLORIDE, SODIUM LACTATE, POTASSIUM CHLORIDE, CALCIUM CHLORIDE 600; 310; 30; 20 MG/100ML; MG/100ML; MG/100ML; MG/100ML
INJECTION, SOLUTION INTRAVENOUS CONTINUOUS
Status: DISCONTINUED | OUTPATIENT
Start: 2019-09-04 | End: 2019-09-04

## 2019-09-04 RX ORDER — LIDOCAINE HYDROCHLORIDE 10 MG/ML
INJECTION, SOLUTION EPIDURAL; INFILTRATION; INTRACAUDAL; PERINEURAL AS NEEDED
Status: DISCONTINUED | OUTPATIENT
Start: 2019-09-04 | End: 2019-09-04 | Stop reason: SURG

## 2019-09-04 RX ADMIN — SODIUM CHLORIDE, SODIUM LACTATE, POTASSIUM CHLORIDE, CALCIUM CHLORIDE: 600; 310; 30; 20 INJECTION, SOLUTION INTRAVENOUS at 12:55:00

## 2019-09-04 RX ADMIN — LIDOCAINE HYDROCHLORIDE 50 MG: 10 INJECTION, SOLUTION EPIDURAL; INFILTRATION; INTRACAUDAL; PERINEURAL at 12:38:00

## 2019-09-04 RX ADMIN — SODIUM CHLORIDE, SODIUM LACTATE, POTASSIUM CHLORIDE, CALCIUM CHLORIDE: 600; 310; 30; 20 INJECTION, SOLUTION INTRAVENOUS at 12:37:00

## 2019-09-04 NOTE — OPERATIVE REPORT
[facility] HOSP - [facility] Endoscopy Report      Preoperative Diagnosis:  - history of ulcerative colitis      Postoperative Diagnosis:  - proctitis  - focal erosions in ileum  - diverticulosis   - internal hemorrhoids      Procedure:    Colonoscopy

## 2019-09-04 NOTE — ANESTHESIA POSTPROCEDURE EVALUATION
Patient: Albino Mcnally    Procedure Summary     Date:  09/04/19 Room / Location:  [address] [place] [address] ENDOSCOPY 05 / [address] [place] [address] ENDOSCOPY    Anesthesia Start:  2406 Anesthesia Stop:  1300    Procedure:  COLONOSCOPY (N/A ) Diagnosis:       Mild chronic ulcerative colitis without

## 2019-09-04 NOTE — H&P
History & Physical Examination    Patient Name: Romero Esqueda  MRN: [id number]  CSN: [id number]  YOB: 1940    Diagnosis: history of UC    Medications Prior to Admission:  Fluticasone-Umeclidin-Vilant (TRELEGY ELLIPTA) 100-62.5-25 MCG/INH Inh CATARACT     • CATH DRUG ELUTING STENT     • CHOLECYSTECTOMY     • COLONOSCOPY     • HERNIA SURGERY       History reviewed. No pertinent family history.   Social History    Tobacco Use      Smoking status: Never Smoker      Smokeless tobacco: Never Used

## 2019-09-04 NOTE — ANESTHESIA PREPROCEDURE EVALUATION
Anesthesia PreOp Note    HPI:     Filipe Granados is a 70year old male who presents for preoperative consultation requested by: Danilo Hawkins MD    Date of Surgery: 9/4/2019    Procedure(s):  COLONOSCOPY  Indication: Mild chronic ulcerative colitis with Oral Cap Take 100 mg by mouth 3 (three) times daily. Disp:  Rfl:  6 months ago   triamcinolone acetonide 0.1 % External Ointment Apply 1 Application topically 2 (two) times daily.    Disp:  Rfl:  at prn   Clobetasol Propionate 0.05 % External Ointment Colleen connections:        Talks on phone: Not on file        Gets together: Not on file        Attends Christianity service: Not on file        Active member of club or organization: Not on file        Attends meetings of clubs or organizations: Not on file Patient      I have informed April Kenzie and/or legal guardian or family member of the nature of the anesthetic plan, benefits, risks including possible dental damage if relevant, major complications, and any alternative forms of anesthetic management.

## 2019-09-05 VITALS
BODY MASS INDEX: 18.96 KG/M2 | OXYGEN SATURATION: 97 % | WEIGHT: 128 LBS | RESPIRATION RATE: 19 BRPM | SYSTOLIC BLOOD PRESSURE: 112 MMHG | DIASTOLIC BLOOD PRESSURE: 62 MMHG | HEART RATE: 69 BPM | HEIGHT: 69 IN

## 2019-09-13 ENCOUNTER — TELEPHONE (OUTPATIENT)
Dept: GASTROENTEROLOGY | Facility: CLINIC | Age: 79
End: 2019-09-13

## 2019-09-13 NOTE — TELEPHONE ENCOUNTER
Patient contacted and  name and  verified. Patient informed of results and recommendations. Patient verbalized understanding. Patient asking if he can continue using Preparation H for his hemorrhoids that occasionally bleed. Denies pain or itching at this time. Please advise. Thank you.

## 2019-09-13 NOTE — TELEPHONE ENCOUNTER
2 Year colonoscopy recall placed in system per Dr. Deondre Sharp  . Colonoscopy done on  09/04/19 . Next due on  09/04/21. Snapshot updated. MRI of pancreas done on 08/19/19 by Dr. Deondre Sharp. Next due on 08/19/20. Snapshot updated.

## 2019-09-13 NOTE — TELEPHONE ENCOUNTER
Notes recorded by Christian Dalton MD on 9/12/2019 at 12:57 PM CDT  Colonoscopy results show irritation c/w Ulcerative Colitis. Stay on treatment.    Repeat colonoscopy in 2 years    Repeat the MRI scan pancreas next August ( 2020 ) to monitor

## 2019-10-25 ENCOUNTER — TELEPHONE (OUTPATIENT)
Dept: OTHER | Age: 79
End: 2019-10-25

## 2019-10-25 NOTE — TELEPHONE ENCOUNTER
Clinical staff, please assist, thanks    Please respond to pool: SUSIE [facility] LPN/ALONDRA

## 2019-10-25 NOTE — TELEPHONE ENCOUNTER
----- Message -----   From: Yolis Cardoza   Sent: 10/24/2019   6:09 PM CDT   To: Shantal Jean Customer Response Pool   Subject: Pneumonia Vaccination                              Topic: Selection      I had Pneumonia Vaccination Prenvar 13 about two months ago.

## 2019-10-29 ENCOUNTER — NURSE ONLY (OUTPATIENT)
Dept: INTERNAL MEDICINE CLINIC | Facility: CLINIC | Age: 79
End: 2019-10-29
Payer: COMMERCIAL

## 2019-10-29 DIAGNOSIS — Z23 NEED FOR VACCINATION: ICD-10-CM

## 2019-10-29 PROCEDURE — G0008 ADMIN INFLUENZA VIRUS VAC: HCPCS | Performed by: INTERNAL MEDICINE

## 2019-10-29 PROCEDURE — 90686 IIV4 VACC NO PRSV 0.5 ML IM: CPT | Performed by: INTERNAL MEDICINE

## 2019-11-20 RX ORDER — LOSARTAN POTASSIUM AND HYDROCHLOROTHIAZIDE 12.5; 5 MG/1; MG/1
1 TABLET ORAL DAILY
Qty: 90 TABLET | Refills: 1 | Status: SHIPPED | OUTPATIENT
Start: 2019-11-20 | End: 2020-11-13

## 2019-11-21 NOTE — TELEPHONE ENCOUNTER
Refill passed per [facility] protocol.   Hypertensive Medications  Protocol Criteria:  · Appointment scheduled in the past 6 months or in the next 3 months  · BMP or CMP in the past 12 months  · Creatinine result < 2  Recent Outpatient Visits

## 2019-11-22 ENCOUNTER — TELEPHONE (OUTPATIENT)
Dept: INTERNAL MEDICINE CLINIC | Facility: CLINIC | Age: 79
End: 2019-11-22

## 2019-11-22 RX ORDER — HYDROCHLOROTHIAZIDE 12.5 MG/1
12.5 CAPSULE, GELATIN COATED ORAL DAILY
Qty: 90 CAPSULE | Refills: 1 | Status: SHIPPED | OUTPATIENT
Start: 2019-11-22 | End: 2020-09-14

## 2019-11-22 RX ORDER — LOSARTAN POTASSIUM 50 MG/1
50 TABLET ORAL DAILY
Qty: 90 TABLET | Refills: 1 | Status: SHIPPED | OUTPATIENT
Start: 2019-11-22 | End: 2019-11-26 | Stop reason: ALTCHOICE

## 2019-11-22 NOTE — TELEPHONE ENCOUNTER
Pt walked in asking to see Dr Chaim Castleman today asap. Pt going out of country on Dec 09th and would like to seem him - has  some health issues medications questions pt said , Per Pt he sent an E-mail thru [address] pt said. Pt would like an answer today if possible.

## 2019-11-22 NOTE — TELEPHONE ENCOUNTER
Patient states he needs to be seen prior to Dec 9, as his is leaving the country. Patient scheduled with Lashanda Hough.   Future Appointments   Date Time Provider Kayla Iverson   11/25/2019 10:00 AM ARNIE Anne [facility]     Explained to antoine

## 2019-11-22 NOTE — TELEPHONE ENCOUNTER
Current Outpatient Medications   Medication Sig Dispense Refill   • Losartan Potassium-HCTZ 50-12.5 MG Oral Tab Take 1 tablet by mouth daily. 90 tablet 1     Medication on back order. Can we prescribe separately LOSARTAN 50 AND HCTZ 12.5?

## 2019-11-26 ENCOUNTER — OFFICE VISIT (OUTPATIENT)
Dept: INTERNAL MEDICINE CLINIC | Facility: CLINIC | Age: 79
End: 2019-11-26
Payer: COMMERCIAL

## 2019-11-26 ENCOUNTER — NURSE TRIAGE (OUTPATIENT)
Dept: INTERNAL MEDICINE CLINIC | Facility: CLINIC | Age: 79
End: 2019-11-26

## 2019-11-26 VITALS
SYSTOLIC BLOOD PRESSURE: 140 MMHG | HEIGHT: 69 IN | BODY MASS INDEX: 18.81 KG/M2 | DIASTOLIC BLOOD PRESSURE: 66 MMHG | HEART RATE: 74 BPM | WEIGHT: 127 LBS

## 2019-11-26 DIAGNOSIS — N50.812 LEFT TESTICULAR PAIN: Primary | ICD-10-CM

## 2019-11-26 PROCEDURE — G0463 HOSPITAL OUTPT CLINIC VISIT: HCPCS | Performed by: INTERNAL MEDICINE

## 2019-11-26 PROCEDURE — 99213 OFFICE O/P EST LOW 20 MIN: CPT | Performed by: INTERNAL MEDICINE

## 2019-11-26 RX ORDER — ROSUVASTATIN CALCIUM 10 MG/1
10 TABLET, COATED ORAL DAILY
Qty: 90 TABLET | Refills: 0 | Status: SHIPPED | OUTPATIENT
Start: 2019-11-26 | End: 2020-09-14

## 2019-11-26 NOTE — PROGRESS NOTES
Ivon Shown is a 70year old male. Patient presents with:  Pain: Testicle pain  Hyperlipidemia: med refill    HPI:   About 5 days ago, he noticed the abrupt onset of left testicular pain.   Pain lasted for about 2 days, and now has almost completely resol Stent placement   • High blood pressure    • High cholesterol    • Pneumonia due to organism      Past Surgical History:   Procedure Laterality Date   • CATARACT     • CATH DRUG ELUTING STENT     • CHOLECYSTECTOMY     • COLONOSCOPY     • COLONOSCOPY N/A

## 2019-11-26 NOTE — TELEPHONE ENCOUNTER
Action Requested: Summary for Provider     []  Critical Lab, Recommendations Needed  [] Need Additional Advice  []   FYI    []   Need Orders  [] Need Medications Sent to Pharmacy  []  Other     SUMMARY:c/c left testicle pain x 5 days, no swelling , urinati

## 2019-11-26 NOTE — TELEPHONE ENCOUNTER
Pt requesting an appt through [employer] with following sx:     With Provider: Bridger Monge MD [facility], [facility], [address], [place]]      Preferred Date Range: 11/22/2019 – 11/30/2019      Preferred Times: Any time      Reason for visit: New Problem / Isreal Gandhi

## 2019-12-30 RX ORDER — MESALAMINE 500 MG/1
1000 CAPSULE ORAL 3 TIMES DAILY
Qty: 180 CAPSULE | Refills: 11 | Status: SHIPPED | OUTPATIENT
Start: 2019-12-30 | End: 2020-01-07

## 2019-12-30 NOTE — TELEPHONE ENCOUNTER
Duplicate refill request  Requested Prescriptions     Pending Prescriptions Disp Refills   • PENTASA 500 MG Oral Cap  capsule 11     Sig: Take 2 capsules (1,000 mg total) by mouth 3 (three) times daily.

## 2019-12-30 NOTE — TELEPHONE ENCOUNTER
Rx has been refilled. Nursing: Patient will be due for follow-up in March. If the patient is asymptomatic and stable on therapy, I can see him for IBD cornerstone.   If scheduling with me, please block 1 hour for cornerstone and asked the patient to will

## 2020-01-07 RX ORDER — MESALAMINE 500 MG/1
CAPSULE ORAL
Qty: 180 CAPSULE | Refills: 3 | Status: SHIPPED | OUTPATIENT
Start: 2020-01-07 | End: 2020-03-11

## 2020-01-09 ENCOUNTER — PATIENT MESSAGE (OUTPATIENT)
Dept: GASTROENTEROLOGY | Facility: CLINIC | Age: 80
End: 2020-01-09

## 2020-01-09 NOTE — TELEPHONE ENCOUNTER
From: Frances Brown  To: Vernell Drummond. Annmarie Spear MD  Sent: 1/9/2020 1:14 PM CST  Subject: Other    Hello:    I have an appointment with Dr Annmarie Spear on March 11th at 11.15 am. Can this be changed to any other date but NOT on March 02,11and 12.     Thanks

## 2020-03-10 NOTE — TELEPHONE ENCOUNTER
· Please advise on refill. Thanks. Last rx:  temazepam 15 MG Oral Cap 30 capsule 2 6/10/2019    Sig:   Take 1 capsule (15 mg total) by mouth nightly as needed.          Recent Outpatient Visits            3 months ago Left testicular pain    [facility]

## 2020-03-11 ENCOUNTER — OFFICE VISIT (OUTPATIENT)
Dept: GASTROENTEROLOGY | Facility: CLINIC | Age: 80
End: 2020-03-11
Payer: COMMERCIAL

## 2020-03-11 VITALS
HEART RATE: 78 BPM | WEIGHT: 130 LBS | DIASTOLIC BLOOD PRESSURE: 65 MMHG | BODY MASS INDEX: 19.26 KG/M2 | SYSTOLIC BLOOD PRESSURE: 117 MMHG | HEIGHT: 69 IN

## 2020-03-11 DIAGNOSIS — K51.90 MILD CHRONIC ULCERATIVE COLITIS WITHOUT COMPLICATION (HCC): ICD-10-CM

## 2020-03-11 DIAGNOSIS — K86.2 PANCREATIC CYST: Primary | ICD-10-CM

## 2020-03-11 PROCEDURE — 99213 OFFICE O/P EST LOW 20 MIN: CPT | Performed by: INTERNAL MEDICINE

## 2020-03-11 PROCEDURE — G0463 HOSPITAL OUTPT CLINIC VISIT: HCPCS | Performed by: INTERNAL MEDICINE

## 2020-03-11 RX ORDER — MESALAMINE 500 MG/1
1000 CAPSULE ORAL 3 TIMES DAILY
Qty: 180 CAPSULE | Refills: 11 | Status: SHIPPED | OUTPATIENT
Start: 2020-03-11 | End: 2021-08-05

## 2020-03-11 RX ORDER — ERGOCALCIFEROL 1.25 MG/1
CAPSULE ORAL
COMMUNITY
Start: 2020-01-29 | End: 2021-08-14

## 2020-03-11 RX ORDER — VALSARTAN 320 MG/1
TABLET ORAL
COMMUNITY
Start: 2020-02-22 | End: 2020-09-14

## 2020-03-11 NOTE — PATIENT INSTRUCTIONS
Pancreas cysts  - repeat MRI pancreas in August ( order)    Ulcerative colitis  - pentassa   - lab work annually ( CBC and CMP )

## 2020-03-12 RX ORDER — TEMAZEPAM 15 MG/1
15 CAPSULE ORAL NIGHTLY PRN
Qty: 30 CAPSULE | Refills: 2 | Status: SHIPPED | OUTPATIENT
Start: 2020-03-12 | End: 2020-05-05

## 2020-03-18 NOTE — PROGRESS NOTES
Ed Dheeraj is a 70year old male. HPI:   Patient presents with: Follow - Up: pt needs refill    The patient is a 68-year-old male who has a history of ulcerative colitis. He has been doing well on Pentasa monotherapy.   He has no symptoms of abdomin EC Take 81 mg by mouth. • Fluocinolone Acetonide 0.01 % External Solution      • gabapentin 100 MG Oral Cap Take 100 mg by mouth 3 (three) times daily.        • triamcinolone acetonide 0.1 % External Ointment Apply 1 Application topically 2 (two) times has a history of pancreatic cysts which have been noted. He is due for repeat MRI scan in August.  He understands this.       Plan  Pancreas cysts  - repeat MRI pancreas in August ( order)    Ulcerative colitis  - pentassa   - lab work annually ( CBC and C

## 2020-05-05 ENCOUNTER — TELEPHONE (OUTPATIENT)
Dept: INTERNAL MEDICINE CLINIC | Facility: CLINIC | Age: 80
End: 2020-05-05

## 2020-05-05 RX ORDER — TEMAZEPAM 15 MG/1
15 CAPSULE ORAL NIGHTLY PRN
Qty: 30 CAPSULE | Refills: 2 | Status: SHIPPED | OUTPATIENT
Start: 2020-05-05 | End: 2020-06-13

## 2020-05-05 NOTE — TELEPHONE ENCOUNTER
Patient called and advised he has a few of the pills left and needs a refill. He is worried though because he has no more refills and is supposed to come in for a medication check with the DR, but due to his age he is not comfortable going into the office.

## 2020-06-14 RX ORDER — TEMAZEPAM 15 MG/1
15 CAPSULE ORAL NIGHTLY PRN
Qty: 30 CAPSULE | Refills: 2 | Status: SHIPPED | OUTPATIENT
Start: 2020-06-14 | End: 2020-09-14

## 2020-06-19 ENCOUNTER — TELEPHONE (OUTPATIENT)
Dept: GASTROENTEROLOGY | Facility: CLINIC | Age: 80
End: 2020-06-19

## 2020-06-19 ENCOUNTER — TELEPHONE (OUTPATIENT)
Dept: CASE MANAGEMENT | Age: 80
End: 2020-06-19

## 2020-06-19 DIAGNOSIS — K86.9 LESION OF PANCREAS: Primary | ICD-10-CM

## 2020-06-19 NOTE — TELEPHONE ENCOUNTER
----- Message from Anoop Brown RN sent at 2019  2:19 PM CDT -----  Regardin year pancreas MRI recall  Per Dr. Duncan Lopez, patient to repeat MRI pancreas in 1 year.

## 2020-06-19 NOTE — TELEPHONE ENCOUNTER
Pt informed is eligible for 2020 Medicare Advantage Supervisit, pt requested an appointment end of September scheduled for 9/21/2020.

## 2020-07-13 ENCOUNTER — TELEPHONE (OUTPATIENT)
Dept: INTERNAL MEDICINE CLINIC | Facility: CLINIC | Age: 80
End: 2020-07-13

## 2020-07-13 DIAGNOSIS — R91.1 PULMONARY NODULE: Primary | ICD-10-CM

## 2020-07-13 NOTE — TELEPHONE ENCOUNTER
Dr. Vilma Valle, please advise on patient request for a new CT chest order.      CT CHEST(CONTRAST ONLY) (CPT=71260) ([id number]) [[id number]] (Order [id number])   Imaging   Date: 8/21/2019 Department: [facility], [facility], [address] [place] [address],[address], [place] Ordering/Authorizing: Delfino Chamorro

## 2020-07-16 NOTE — TELEPHONE ENCOUNTER
Patient asking for a CT scan with no contrast?  The CT scan of the chest that was done in July of last year was done with contrast.  The one we are doing now is to be directly compared to the other one.   It is typically best if they are both done in the [place]

## 2020-07-16 NOTE — TELEPHONE ENCOUNTER
Dr. Briseida Nelson will be  in a few days. Would you like patient to have this repeat MRI Abdomen completed? If so, please place new order and I will continue to try to contact patient. He has a follow up in office with you scheduled.     Thank yo

## 2020-07-17 NOTE — TELEPHONE ENCOUNTER
Dr. Mike Hoskins, patient doesn't want to have contrast for the exam. States the techs had a hard time finding a good vein and did not like the hot sensation of the contrast. Please advise.

## 2020-07-17 NOTE — TELEPHONE ENCOUNTER
Called pt and informed that CT has been ordered without contrast   Patient verbalized understanding and transferred to Central scheduling

## 2020-07-17 NOTE — TELEPHONE ENCOUNTER
Patient aware that he is due for repeat MRI.   He told me that he already has the number to central scheduling an knows how to schedule this test.    Patient informed me that he is going for a CT scan of his chest next week and wants the results of that bef

## 2020-07-20 ENCOUNTER — PATIENT MESSAGE (OUTPATIENT)
Dept: INTERNAL MEDICINE CLINIC | Facility: CLINIC | Age: 80
End: 2020-07-20

## 2020-07-20 NOTE — TELEPHONE ENCOUNTER
From: Casey Honeycutt  To: Tyrel Apple MD  Sent: 7/20/2020 1:40 PM CDT  Subject: Other    Hello:    I need a Chest CT scan to be scheduled . This has been approved by Dr Tyrel Apple. Where is the phone number to call CT scan deptt?

## 2020-07-31 NOTE — TELEPHONE ENCOUNTER
Patient told me that he wants to complete CT of chest before scheduling MRI. Patient aware that MRI is due. CT of chest changed to be done 8/4.

## 2020-08-04 ENCOUNTER — HOSPITAL ENCOUNTER (OUTPATIENT)
Dept: CT IMAGING | Facility: HOSPITAL | Age: 80
Discharge: HOME | End: 2020-08-04
Attending: INTERNAL MEDICINE
Payer: MEDICARE

## 2020-08-04 DIAGNOSIS — R91.1 PULMONARY NODULE: ICD-10-CM

## 2020-08-04 PROCEDURE — 71250 CT THORAX DX C-: CPT | Performed by: INTERNAL MEDICINE

## 2020-08-13 NOTE — TELEPHONE ENCOUNTER
Patient aware that MRI advised per previous documentation. Patient has not yet scheduled this test.    I sent another letter with order requisition AND a MyChart message to remind the patient.     Thank you

## 2020-08-25 ENCOUNTER — HOSPITAL ENCOUNTER (OUTPATIENT)
Dept: MRI IMAGING | Facility: HOSPITAL | Age: 80
Discharge: HOME | End: 2020-08-25
Attending: INTERNAL MEDICINE
Payer: MEDICARE

## 2020-08-25 DIAGNOSIS — K86.9 LESION OF PANCREAS: ICD-10-CM

## 2020-08-25 LAB — CREAT BLD-MCNC: 0.8 MG/DL (ref 0.7–1.3)

## 2020-08-25 PROCEDURE — 74183 MRI ABD W/O CNTR FLWD CNTR: CPT | Performed by: INTERNAL MEDICINE

## 2020-08-25 PROCEDURE — 82565 ASSAY OF CREATININE: CPT

## 2020-08-25 PROCEDURE — A9575 INJ GADOTERATE MEGLUMI 0.1ML: HCPCS | Performed by: INTERNAL MEDICINE

## 2020-09-10 ENCOUNTER — OFFICE VISIT (OUTPATIENT)
Dept: GASTROENTEROLOGY | Facility: CLINIC | Age: 80
End: 2020-09-10
Payer: COMMERCIAL

## 2020-09-10 VITALS
DIASTOLIC BLOOD PRESSURE: 80 MMHG | TEMPERATURE: 97 F | SYSTOLIC BLOOD PRESSURE: 158 MMHG | WEIGHT: 130.19 LBS | HEART RATE: 75 BPM | HEIGHT: 69 IN | BODY MASS INDEX: 19.28 KG/M2

## 2020-09-10 DIAGNOSIS — K51.90 MILD CHRONIC ULCERATIVE COLITIS WITHOUT COMPLICATION (HCC): ICD-10-CM

## 2020-09-10 DIAGNOSIS — K86.2 PANCREATIC CYST: Primary | ICD-10-CM

## 2020-09-10 PROCEDURE — 3008F BODY MASS INDEX DOCD: CPT | Performed by: INTERNAL MEDICINE

## 2020-09-10 PROCEDURE — 3077F SYST BP >= 140 MM HG: CPT | Performed by: INTERNAL MEDICINE

## 2020-09-10 PROCEDURE — 99213 OFFICE O/P EST LOW 20 MIN: CPT | Performed by: INTERNAL MEDICINE

## 2020-09-10 PROCEDURE — 3079F DIAST BP 80-89 MM HG: CPT | Performed by: INTERNAL MEDICINE

## 2020-09-10 PROCEDURE — G0463 HOSPITAL OUTPT CLINIC VISIT: HCPCS | Performed by: INTERNAL MEDICINE

## 2020-09-10 NOTE — PROGRESS NOTES
Tripp Briceño is a 70year old male. HPI:   Patient presents with: Follow - Up: 6 months     The patient is a 24-year-old male with history of heart disease, high blood pressure, ulcerative colitis, pancreatic cyst who presents in follow-up today.   Rep 90 capsule 1   • Losartan Potassium-HCTZ 50-12.5 MG Oral Tab Take 1 tablet by mouth daily. 90 tablet 1   • Fluticasone-Umeclidin-Vilant (TRELEGY ELLIPTA) 100-62.5-25 MCG/INH Inhalation Aerosol Powder, Breath Activated Inhale into the lungs.      • aspirin E based therapy. Advised repeat colonoscopy in 1 year which will be about 2 years from his last one. Repeat MRI scan to follow-up with a pancreatic cystic lesion. Advise CBC and CMP every 6 months.     Plan  Ulcerative colitis  - continue on pentasa  - l

## 2020-09-10 NOTE — PATIENT INSTRUCTIONS
Ulcerative colitis  - continue on pentasa  - lab work every 6 months   - colonoscopy in 2021    Pancreas cyst  - repeat MRI in 2 years ( July 2022)

## 2020-09-14 ENCOUNTER — OFFICE VISIT (OUTPATIENT)
Dept: INTERNAL MEDICINE CLINIC | Facility: CLINIC | Age: 80
End: 2020-09-14
Payer: COMMERCIAL

## 2020-09-14 VITALS
WEIGHT: 129.31 LBS | DIASTOLIC BLOOD PRESSURE: 62 MMHG | HEIGHT: 69 IN | BODY MASS INDEX: 19.15 KG/M2 | RESPIRATION RATE: 20 BRPM | SYSTOLIC BLOOD PRESSURE: 120 MMHG | HEART RATE: 74 BPM

## 2020-09-14 DIAGNOSIS — Z00.00 ENCOUNTER FOR ANNUAL HEALTH EXAMINATION: Primary | ICD-10-CM

## 2020-09-14 DIAGNOSIS — I25.10 CORONARY ARTERY DISEASE INVOLVING NATIVE HEART WITHOUT ANGINA PECTORIS, UNSPECIFIED VESSEL OR LESION TYPE: ICD-10-CM

## 2020-09-14 DIAGNOSIS — G47.00 INSOMNIA, UNSPECIFIED TYPE: ICD-10-CM

## 2020-09-14 DIAGNOSIS — I10 ESSENTIAL HYPERTENSION: ICD-10-CM

## 2020-09-14 DIAGNOSIS — R00.2 PALPITATIONS: ICD-10-CM

## 2020-09-14 DIAGNOSIS — E78.5 HYPERLIPIDEMIA, UNSPECIFIED HYPERLIPIDEMIA TYPE: ICD-10-CM

## 2020-09-14 DIAGNOSIS — R91.1 PULMONARY NODULE: ICD-10-CM

## 2020-09-14 DIAGNOSIS — Z12.5 SCREENING FOR PROSTATE CANCER: ICD-10-CM

## 2020-09-14 DIAGNOSIS — K86.9 PANCREATIC LESION: ICD-10-CM

## 2020-09-14 DIAGNOSIS — Z23 NEED FOR VACCINATION: ICD-10-CM

## 2020-09-14 DIAGNOSIS — R73.9 HYPERGLYCEMIA: ICD-10-CM

## 2020-09-14 DIAGNOSIS — K51.90 ULCERATIVE COLITIS WITHOUT COMPLICATIONS, UNSPECIFIED LOCATION (HCC): ICD-10-CM
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
## 2020-09-14 PROCEDURE — 3008F BODY MASS INDEX DOCD: CPT | Performed by: INTERNAL MEDICINE

## 2020-09-14 PROCEDURE — 99397 PER PM REEVAL EST PAT 65+ YR: CPT | Performed by: INTERNAL MEDICINE

## 2020-09-14 PROCEDURE — 3074F SYST BP LT 130 MM HG: CPT | Performed by: INTERNAL MEDICINE

## 2020-09-14 PROCEDURE — 90662 IIV NO PRSV INCREASED AG IM: CPT | Performed by: INTERNAL MEDICINE

## 2020-09-14 PROCEDURE — 96160 PT-FOCUSED HLTH RISK ASSMT: CPT | Performed by: INTERNAL MEDICINE

## 2020-09-14 PROCEDURE — G0008 ADMIN INFLUENZA VIRUS VAC: HCPCS | Performed by: INTERNAL MEDICINE

## 2020-09-14 PROCEDURE — 3078F DIAST BP <80 MM HG: CPT | Performed by: INTERNAL MEDICINE

## 2020-09-14 PROCEDURE — G0439 PPPS, SUBSEQ VISIT: HCPCS | Performed by: INTERNAL MEDICINE

## 2020-09-14 RX ORDER — METOPROLOL SUCCINATE 25 MG/1
25 TABLET, EXTENDED RELEASE ORAL DAILY
Qty: 90 TABLET | Refills: 1 | Status: SHIPPED | OUTPATIENT
Start: 2020-09-14 | End: 2021-08-14

## 2020-09-14 RX ORDER — ROSUVASTATIN CALCIUM 10 MG/1
10 TABLET, COATED ORAL DAILY
Qty: 90 TABLET | Refills: 0 | Status: SHIPPED | OUTPATIENT
Start: 2020-09-14 | End: 2021-11-13

## 2020-09-14 RX ORDER — TEMAZEPAM 15 MG/1
15 CAPSULE ORAL NIGHTLY PRN
Qty: 90 CAPSULE | Refills: 0 | Status: SHIPPED | OUTPATIENT
Start: 2020-09-14 | End: 2021-08-17

## 2020-09-14 NOTE — PROGRESS NOTES
HPI:   Gisselle Baeza is a 70year old male who presents for a MA (Medicare Advantage) [facility] (Once per calendar year). Patient is here for Medicare advantage AHA visit and follow-up on chronic medical problems as listed below.   Last seen in the of depressed, or hopeless (over the last two weeks)?: Not at all  PHQ-2 SCORE: 0     Advanced Directive:   He does NOT have a Living Will on file in [address] [place] [address] Rd.    The patient has this document but we do not have it in [place], and patient is instructed to get our offi needed. ergocalciferol 1.25 MG (81274 UT) Oral Cap,   PENTASA 500 MG Oral Cap CR, Take 2 capsules (1,000 mg total) by mouth 3 (three) times daily. Rosuvastatin Calcium 10 MG Oral Tab, Take 1 tablet (10 mg total) by mouth daily.   Losartan Potassium-HCTZ 5 are talking at the same time:  No   I have trouble understanding things on the TV:  No I have to strain to understand conversations:  No   I have to worry about missing the telephone ring or doorbell:  No I have trouble hearing conversations in a noisy gem exhibits no mass. There is no tenderness. Hernia confirmed negative in the right inguinal area and confirmed negative in the left inguinal area. Genitourinary:    Testes and penis normal.   Circumcised. Musculoskeletal: He exhibits no tenderness.    Lymph heart without angina pectoris, unspecified vessel or lesion type  Asymptomatic. Continue current treatment. Advised to restart the aspirin 81 mg a day. 6. Hyperglycemia  No history of diabetes. Check blood sugar and A1c.  - HEMOGLOBIN A1C    7.  Insom 110 (H)          Cardiovascular Disease Screening     LDL Annually LDL Cholesterol (mg/dL)   Date Value   08/10/2018 84        EKG - w/ Initial Preventative Physical Exam only, or if medically necessary Electrocardiogram date03/14/2019    Colorectal Cancer Internal Lab or Procedure External Lab or Procedure      Annual Monitoring of Persistent     Medications (ACE/ARB, digoxin diuretics, anticonvulsants.)    Potassium  Annually Potassium (mmol/L)   Date Value   01/01/2019 3.4    No flowsheet data found.     C

## 2020-09-14 NOTE — PATIENT INSTRUCTIONS
Prasad Dennis's SCREENING SCHEDULE   Tests on this list are recommended by your physician but may not be covered, or covered at this frequency, by your insurer. Please check with your insurance carrier before scheduling to verify coverage.     PREVENTATIV 10 years- more often if abnormal Colonoscopy due on 09/04/2021 Update [facility] if applicable    Flex Sigmoidoscopy Screen  Covered every 5 years No results found for this or any previous visit. No flowsheet data found.      Fecal Occult Blood   Co Medicare Part B) No orders found for this or any previous visit.  This may be covered with your prescription benefits, but Medicare does not cover unless Medically needed    Zoster (Not covered by Medicare Part B) No orders found for this or any previous vi

## 2020-09-17 ENCOUNTER — APPOINTMENT (OUTPATIENT)
Dept: LAB | Facility: HOSPITAL | Age: 80
End: 2020-09-17
Attending: INTERNAL MEDICINE
Payer: MEDICARE

## 2020-09-17 DIAGNOSIS — Z12.5 SCREENING FOR PROSTATE CANCER: ICD-10-CM

## 2020-09-17 LAB
ALBUMIN SERPL-MCNC: 3.7 G/DL (ref 3.4–5)
ALBUMIN/GLOB SERPL: 0.9 {RATIO} (ref 1–2)
ALP LIVER SERPL-CCNC: 102 U/L (ref 45–117)
ALT SERPL-CCNC: 19 U/L (ref 16–61)
ANION GAP SERPL CALC-SCNC: 2 MMOL/L (ref 0–18)
AST SERPL-CCNC: 18 U/L (ref 15–37)
BASOPHILS # BLD AUTO: 0.11 X10(3) UL (ref 0–0.2)
BASOPHILS NFR BLD AUTO: 1 %
BILIRUB SERPL-MCNC: 0.8 MG/DL (ref 0.1–2)
BUN BLD-MCNC: 19 MG/DL (ref 7–18)
BUN/CREAT SERPL: 21.3 (ref 10–20)
CALCIUM BLD-MCNC: 8.8 MG/DL (ref 8.5–10.1)
CHLORIDE SERPL-SCNC: 106 MMOL/L (ref 98–112)
CHOLEST SMN-MCNC: 185 MG/DL (ref ?–200)
CO2 SERPL-SCNC: 31 MMOL/L (ref 21–32)
COMPLEXED PSA SERPL-MCNC: 1.74 NG/ML (ref ?–4)
CREAT BLD-MCNC: 0.89 MG/DL (ref 0.7–1.3)
DEPRECATED RDW RBC AUTO: 44.7 FL (ref 35.1–46.3)
EOSINOPHIL # BLD AUTO: 0.45 X10(3) UL (ref 0–0.7)
EOSINOPHIL NFR BLD AUTO: 4.1 %
ERYTHROCYTE [DISTWIDTH] IN BLOOD BY AUTOMATED COUNT: 13.2 % (ref 11–15)
EST. AVERAGE GLUCOSE BLD GHB EST-MCNC: 123 MG/DL (ref 68–126)
GLOBULIN PLAS-MCNC: 4.1 G/DL (ref 2.8–4.4)
GLUCOSE BLD-MCNC: 104 MG/DL (ref 70–99)
HBA1C MFR BLD HPLC: 5.9 % (ref ?–5.7)
HCT VFR BLD AUTO: 43.7 % (ref 39–53)
HDLC SERPL-MCNC: 44 MG/DL (ref 40–59)
HGB BLD-MCNC: 14 G/DL (ref 13–17.5)
IMM GRANULOCYTES # BLD AUTO: 0.05 X10(3) UL (ref 0–1)
IMM GRANULOCYTES NFR BLD: 0.5 %
LDLC SERPL CALC-MCNC: 114 MG/DL (ref ?–100)
LYMPHOCYTES # BLD AUTO: 1.91 X10(3) UL (ref 1–4)
LYMPHOCYTES NFR BLD AUTO: 17.4 %
M PROTEIN MFR SERPL ELPH: 7.8 G/DL (ref 6.4–8.2)
MCH RBC QN AUTO: 29.2 PG (ref 26–34)
MCHC RBC AUTO-ENTMCNC: 32 G/DL (ref 31–37)
MCV RBC AUTO: 91 FL (ref 80–100)
MONOCYTES # BLD AUTO: 1.27 X10(3) UL (ref 0.1–1)
MONOCYTES NFR BLD AUTO: 11.6 %
NEUTROPHILS # BLD AUTO: 7.2 X10 (3) UL (ref 1.5–7.7)
NEUTROPHILS # BLD AUTO: 7.2 X10(3) UL (ref 1.5–7.7)
NEUTROPHILS NFR BLD AUTO: 65.4 %
NONHDLC SERPL-MCNC: 141 MG/DL (ref ?–130)
OSMOLALITY SERPL CALC.SUM OF ELEC: 291 MOSM/KG (ref 275–295)
PATIENT FASTING Y/N/NP: YES
PATIENT FASTING Y/N/NP: YES
PLATELET # BLD AUTO: 320 10(3)UL (ref 150–450)
POTASSIUM SERPL-SCNC: 3.6 MMOL/L (ref 3.5–5.1)
RBC # BLD AUTO: 4.8 X10(6)UL (ref 3.8–5.8)
SODIUM SERPL-SCNC: 139 MMOL/L (ref 136–145)
TRIGL SERPL-MCNC: 134 MG/DL (ref 30–149)
TSI SER-ACNC: 2.91 MIU/ML (ref 0.36–3.74)
VLDLC SERPL CALC-MCNC: 27 MG/DL (ref 0–30)
WBC # BLD AUTO: 11 X10(3) UL (ref 4–11)

## 2020-09-17 PROCEDURE — 36415 COLL VENOUS BLD VENIPUNCTURE: CPT | Performed by: INTERNAL MEDICINE

## 2020-09-17 PROCEDURE — 84443 ASSAY THYROID STIM HORMONE: CPT | Performed by: INTERNAL MEDICINE

## 2020-09-17 PROCEDURE — 83036 HEMOGLOBIN GLYCOSYLATED A1C: CPT | Performed by: INTERNAL MEDICINE

## 2020-09-17 PROCEDURE — 85025 COMPLETE CBC W/AUTO DIFF WBC: CPT | Performed by: INTERNAL MEDICINE

## 2020-09-17 PROCEDURE — 80053 COMPREHEN METABOLIC PANEL: CPT | Performed by: INTERNAL MEDICINE

## 2020-09-17 PROCEDURE — 80061 LIPID PANEL: CPT | Performed by: INTERNAL MEDICINE

## 2020-09-28 ENCOUNTER — NURSE ONLY (OUTPATIENT)
Dept: INTERNAL MEDICINE CLINIC | Facility: CLINIC | Age: 80
End: 2020-09-28
Payer: COMMERCIAL

## 2020-09-28 DIAGNOSIS — Z23 IMMUNIZATION DUE: Primary | ICD-10-CM

## 2020-09-28 PROCEDURE — 90732 PPSV23 VACC 2 YRS+ SUBQ/IM: CPT | Performed by: INTERNAL MEDICINE

## 2020-09-28 PROCEDURE — G0009 ADMIN PNEUMOCOCCAL VACCINE: HCPCS | Performed by: INTERNAL MEDICINE

## 2020-09-28 NOTE — PROGRESS NOTES
Patient came in today for their scheduled nurse visit. Dr. Juju Rios gave verbal order for patient to receive Pneumovax 23. Patient verified name and . Patient responded well to Pneumovax 23 0.5mL given on right deltoid via IM, no reaction noted.  VIS was

## 2020-11-12 ENCOUNTER — NURSE TRIAGE (OUTPATIENT)
Dept: INTERNAL MEDICINE CLINIC | Facility: CLINIC | Age: 80
End: 2020-11-12

## 2020-11-12 ENCOUNTER — PATIENT MESSAGE (OUTPATIENT)
Dept: INTERNAL MEDICINE CLINIC | Facility: CLINIC | Age: 80
End: 2020-11-12

## 2020-11-12 NOTE — TELEPHONE ENCOUNTER
From: Roz Rodriguez  To: Ti Pichardo MD  Sent: 11/12/2020 8:08 AM CST  Subject: Prescription Question    Hello:    I take Losartan 50 mg and Hydrochlorothiazide 12.5 Mg but I found that my Blood Pressure remains in the range of 145/82.  Can the doze be

## 2020-11-12 NOTE — TELEPHONE ENCOUNTER
From: Cathleen Martini  To: Michael Johnson MD  Sent: 11/12/2020  8:08 AM CST  Subject: Prescription Question    Hello:    I take Losartan 50 mg and Hydrochlorothiazide 12.5 Mg but I found that my Blood Pressure remains in the range of 145/82.  Can the doze b

## 2020-11-12 NOTE — TELEPHONE ENCOUNTER
Action Requested: Summary for Provider     []  Critical Lab, Recommendations Needed  [] Need Additional Advice  []   FYI    [x]   Need Orders  [] Need Medications Sent to Pharmacy  []  Other     SUMMARY: Dr. Robb Blood:   Patient asked if his medication needs

## 2020-11-13 RX ORDER — LOSARTAN POTASSIUM AND HYDROCHLOROTHIAZIDE 12.5; 1 MG/1; MG/1
1 TABLET ORAL DAILY
Qty: 90 TABLET | Refills: 1 | Status: SHIPPED | OUTPATIENT
Start: 2020-11-13 | End: 2021-08-14

## 2020-11-13 NOTE — TELEPHONE ENCOUNTER
Rx for increased losartan HCTZ 100/12.5 sent to pharmacy. Continue same dose of metoprolol. Ariel pt.

## 2020-11-15 RX ORDER — LOSARTAN POTASSIUM 50 MG/1
TABLET ORAL
Qty: 90 TABLET | Refills: 0 | OUTPATIENT
Start: 2020-11-15

## 2020-11-27 RX ORDER — HYDROCHLOROTHIAZIDE 12.5 MG/1
CAPSULE, GELATIN COATED ORAL
Qty: 90 CAPSULE | Refills: 0 | OUTPATIENT
Start: 2020-11-27

## 2020-11-27 RX ORDER — LOSARTAN POTASSIUM 50 MG/1
TABLET ORAL
Qty: 90 TABLET | Refills: 0 | OUTPATIENT
Start: 2020-11-27

## 2020-11-27 NOTE — TELEPHONE ENCOUNTER
Pharmacy    [facility] [address], [address], [phone]   Outpatient Medication Detail     Disp Refills Start End    Losartan Potassium-HCTZ (HYZAAR) 100-12.5 MG Oral Tab 90 tablet 1 11/13/2020     Sig - Route:  Ta

## 2021-03-04 ENCOUNTER — TELEPHONE (OUTPATIENT)
Dept: CASE MANAGEMENT | Age: 81
End: 2021-03-04

## 2021-03-04 NOTE — TELEPHONE ENCOUNTER
Patient informed  is eligible for 2021 Medicare Advantage Supervisit, states is out of town and requested a call back end of May to schedule.

## 2021-03-09 DIAGNOSIS — Z23 NEED FOR VACCINATION: ICD-10-CM

## 2021-06-08 ENCOUNTER — TELEPHONE (OUTPATIENT)
Dept: CASE MANAGEMENT | Age: 81
End: 2021-06-08

## 2021-06-08 NOTE — TELEPHONE ENCOUNTER
Patient is eligible for a 2021 Medicare Annual Wellness visit. Left message to call back [phone].

## 2021-07-07 ENCOUNTER — TELEPHONE (OUTPATIENT)
Dept: GASTROENTEROLOGY | Facility: CLINIC | Age: 81
End: 2021-07-07

## 2021-07-07 NOTE — TELEPHONE ENCOUNTER
----- Message from Lisseth Peng RN sent at 9/13/2019 11:09 AM CDT -----   2 Year colonoscopy recall placed in system per Dr. Taiwo Gomez  . Colonoscopy done on  09/04/19 . Next due on  09/04/21. Snapshot updated.

## 2021-07-31 ENCOUNTER — PATIENT MESSAGE (OUTPATIENT)
Dept: GASTROENTEROLOGY | Facility: CLINIC | Age: 81
End: 2021-07-31

## 2021-08-02 NOTE — TELEPHONE ENCOUNTER
From: Pretty Rodriguez  To: Hollis Thompson. Dallin Underwood MD  Sent: 7/31/2021 12:15 PM CDT  Subject: Visit Follow-up Question    Hello:    My appointment with Dr Dallin Underwood was scheduled on saturday August 11,2021 but I donot see anything in my chart. What happen to that appo

## 2021-08-05 ENCOUNTER — OFFICE VISIT (OUTPATIENT)
Dept: GASTROENTEROLOGY | Facility: CLINIC | Age: 81
End: 2021-08-05
Payer: COMMERCIAL

## 2021-08-05 VITALS
WEIGHT: 123.19 LBS | SYSTOLIC BLOOD PRESSURE: 103 MMHG | DIASTOLIC BLOOD PRESSURE: 56 MMHG | HEIGHT: 69 IN | TEMPERATURE: 99 F | BODY MASS INDEX: 18.25 KG/M2 | HEART RATE: 69 BPM

## 2021-08-05 DIAGNOSIS — K86.9 LESION OF PANCREAS: Primary | ICD-10-CM

## 2021-08-05 DIAGNOSIS — K51.90 MILD CHRONIC ULCERATIVE COLITIS WITHOUT COMPLICATION (HCC): ICD-10-CM

## 2021-08-05 PROCEDURE — 3078F DIAST BP <80 MM HG: CPT | Performed by: INTERNAL MEDICINE

## 2021-08-05 PROCEDURE — 3008F BODY MASS INDEX DOCD: CPT | Performed by: INTERNAL MEDICINE

## 2021-08-05 PROCEDURE — 99213 OFFICE O/P EST LOW 20 MIN: CPT | Performed by: INTERNAL MEDICINE

## 2021-08-05 PROCEDURE — 3074F SYST BP LT 130 MM HG: CPT | Performed by: INTERNAL MEDICINE

## 2021-08-05 RX ORDER — MESALAMINE 500 MG/1
1000 CAPSULE ORAL 3 TIMES DAILY
Qty: 180 CAPSULE | Refills: 11 | Status: SHIPPED | OUTPATIENT
Start: 2021-08-05

## 2021-08-05 NOTE — PROGRESS NOTES
Shereen Starr is a 68year old male. HPI:   Patient presents with: Follow - Up    The patient is a 68year old male with a history of pancreatic cyst, ulcerative colitis/proctitis who follows up in the office today.   The patient has been doing well, h Social History    Tobacco Use      Smoking status: Never Smoker      Smokeless tobacco: Never Used    Vaping Use      Vaping Use: Never used    Alcohol use: No    Drug use: No       Medications (Active prior to today's visit):  Current Outpatient Medicatio or lesions  Neuro- appropriate response, alert, no confusion  . ASSESSMENT/PLAN:   Assessment   Ulcerative proctitis/colitis  Pancreatic cyst.    The patient has been doing well, his colitis is in remission on mesalamine therapy only.   He is tolerating th

## 2021-08-05 NOTE — PATIENT INSTRUCTIONS
Ulcerative proctitis /colitis  - continue on Pentasa  - repeat colonoscopy in 1 year    Pancreatic cyst  - repeat MRI in 1 year

## 2021-08-14 ENCOUNTER — OFFICE VISIT (OUTPATIENT)
Dept: INTERNAL MEDICINE CLINIC | Facility: CLINIC | Age: 81
End: 2021-08-14
Payer: COMMERCIAL

## 2021-08-14 VITALS
WEIGHT: 122 LBS | RESPIRATION RATE: 18 BRPM | SYSTOLIC BLOOD PRESSURE: 108 MMHG | DIASTOLIC BLOOD PRESSURE: 62 MMHG | BODY MASS INDEX: 18.07 KG/M2 | HEART RATE: 66 BPM | TEMPERATURE: 98 F | HEIGHT: 69 IN

## 2021-08-14 DIAGNOSIS — K51.90 ULCERATIVE COLITIS WITHOUT COMPLICATIONS, UNSPECIFIED LOCATION (HCC): ICD-10-CM

## 2021-08-14 DIAGNOSIS — I10 ESSENTIAL HYPERTENSION: ICD-10-CM

## 2021-08-14 DIAGNOSIS — K86.9 PANCREATIC LESION: ICD-10-CM

## 2021-08-14 DIAGNOSIS — I25.10 CORONARY ARTERY DISEASE INVOLVING NATIVE HEART WITHOUT ANGINA PECTORIS, UNSPECIFIED VESSEL OR LESION TYPE: ICD-10-CM

## 2021-08-14 DIAGNOSIS — Z12.5 SCREENING FOR PROSTATE CANCER: ICD-10-CM

## 2021-08-14 DIAGNOSIS — Z00.00 ENCOUNTER FOR ANNUAL HEALTH EXAMINATION: Primary | ICD-10-CM

## 2021-08-14 DIAGNOSIS — R91.1 PULMONARY NODULE: ICD-10-CM

## 2021-08-14 DIAGNOSIS — E78.5 HYPERLIPIDEMIA, UNSPECIFIED HYPERLIPIDEMIA TYPE: ICD-10-CM

## 2021-08-14 PROCEDURE — 96160 PT-FOCUSED HLTH RISK ASSMT: CPT | Performed by: INTERNAL MEDICINE

## 2021-08-14 PROCEDURE — G0439 PPPS, SUBSEQ VISIT: HCPCS | Performed by: INTERNAL MEDICINE

## 2021-08-14 PROCEDURE — 3008F BODY MASS INDEX DOCD: CPT | Performed by: INTERNAL MEDICINE

## 2021-08-14 PROCEDURE — 3078F DIAST BP <80 MM HG: CPT | Performed by: INTERNAL MEDICINE

## 2021-08-14 PROCEDURE — 3074F SYST BP LT 130 MM HG: CPT | Performed by: INTERNAL MEDICINE

## 2021-08-14 PROCEDURE — 99397 PER PM REEVAL EST PAT 65+ YR: CPT | Performed by: INTERNAL MEDICINE

## 2021-08-14 RX ORDER — ERGOCALCIFEROL 1.25 MG/1
50000 CAPSULE ORAL WEEKLY
Qty: 12 CAPSULE | Refills: 1 | Status: SHIPPED | OUTPATIENT
Start: 2021-08-14

## 2021-08-14 RX ORDER — LOSARTAN POTASSIUM AND HYDROCHLOROTHIAZIDE 12.5; 1 MG/1; MG/1
1 TABLET ORAL DAILY
Qty: 90 TABLET | Refills: 1 | Status: SHIPPED | OUTPATIENT
Start: 2021-08-14

## 2021-08-14 RX ORDER — METOPROLOL SUCCINATE 25 MG/1
25 TABLET, EXTENDED RELEASE ORAL DAILY
Qty: 90 TABLET | Refills: 1 | Status: SHIPPED | OUTPATIENT
Start: 2021-08-14

## 2021-08-14 NOTE — PATIENT INSTRUCTIONS
Prasad Dennis's SCREENING SCHEDULE   Tests on this list are recommended by your physician but may not be covered, or covered at this frequency, by your insurer. Please check with your insurance carrier before scheduling to verify coverage.    CROW 09/14/2020  No recommendations at this time    Pneumococcal Each vaccine ([id number] & [id number]) covered once after 65 Prevnar 13: 06/13/2019    [id number]: 09/28/2020     No recommendations at this time    Hepatitis B One screening covered for patients

## 2021-08-14 NOTE — PROGRESS NOTES
HPI:   Marisa Apple is a 68year old male who presents for a MA (Medicare Advantage) [address] [facility] (Once per calendar year). Patient is here requesting Medicare advantage [facility] visit. Last seen here in September of last year for the same reason.   At doris score of 0 on 8/14/2021, showing low risk of alcohol abuse.          Patient Care Team: Patient Care Team:  Radha Dinero MD as PCP - General (Internal Medicine)    Patient Active Problem List:     Essential hypertension     Hyperlipidemia     Ulcerative MEDICAL INFORMATION:   He  has a past medical history of CAD (coronary artery disease) (2009), High blood pressure, High cholesterol, and Pneumonia due to organism.     He  has a past surgical history that includes colonoscopy; cath drug eluting stent; he conversations: No   I have to worry about missing the telephone ring or doorbell: No I have trouble hearing conversations in a noisy background such as a crowded room or restaurant: No   I get confused about where sounds come from: No I misunderstand some exercise capacity and weakness. Exam is unrevealing. Await blood tests. Continue with regular exercise, supplements. 2. Essential hypertension  Well-controlled. Continue current treatment.   - CBC WITH DIFFERENTIAL WITH PLATELET  - COMP METABOLIC PAN Screening    Fasting Blood Sugar / Glucose    One screening every 12 months if never tested or if previously tested but not diagnosed with pre-diabetes   One screening every 6 months if diagnosed with pre-diabetes Lab Results   Component Value Date    GLU Toxoid Not covered by Medicare Part B unless medically necessary (cut with metal); may be covered with your pharmacy prescription benefits -    Tetanus, Diptheria and Pertusis TD and TDaP Not covered by Medicare Part B -  No recommendations at this time

## 2021-08-17 RX ORDER — TEMAZEPAM 15 MG/1
15 CAPSULE ORAL NIGHTLY PRN
Qty: 90 CAPSULE | Refills: 0 | Status: SHIPPED | OUTPATIENT
Start: 2021-08-17 | End: 2021-12-09

## 2021-08-24 ENCOUNTER — NURSE TRIAGE (OUTPATIENT)
Dept: INTERNAL MEDICINE CLINIC | Facility: CLINIC | Age: 81
End: 2021-08-24

## 2021-08-24 NOTE — TELEPHONE ENCOUNTER
Dr. Kyle Terry please advise,    Patient came in requesting an appointment for [facility] [place] 8/25 due to him having a lump around right side inguinal region.

## 2021-08-25 ENCOUNTER — OFFICE VISIT (OUTPATIENT)
Dept: INTERNAL MEDICINE CLINIC | Facility: CLINIC | Age: 81
End: 2021-08-25
Payer: COMMERCIAL

## 2021-08-25 VITALS
RESPIRATION RATE: 18 BRPM | HEIGHT: 69 IN | WEIGHT: 124 LBS | TEMPERATURE: 98 F | SYSTOLIC BLOOD PRESSURE: 132 MMHG | HEART RATE: 74 BPM | DIASTOLIC BLOOD PRESSURE: 66 MMHG | BODY MASS INDEX: 18.37 KG/M2

## 2021-08-25 DIAGNOSIS — L03.311 CELLULITIS OF ABDOMINAL WALL: Primary | ICD-10-CM

## 2021-08-25 PROCEDURE — 3008F BODY MASS INDEX DOCD: CPT | Performed by: INTERNAL MEDICINE

## 2021-08-25 PROCEDURE — 3078F DIAST BP <80 MM HG: CPT | Performed by: INTERNAL MEDICINE

## 2021-08-25 PROCEDURE — 3075F SYST BP GE 130 - 139MM HG: CPT | Performed by: INTERNAL MEDICINE

## 2021-08-25 PROCEDURE — 99213 OFFICE O/P EST LOW 20 MIN: CPT | Performed by: INTERNAL MEDICINE

## 2021-08-25 RX ORDER — CEFADROXIL 500 MG/1
500 CAPSULE ORAL 2 TIMES DAILY
Qty: 14 CAPSULE | Refills: 0 | Status: SHIPPED | OUTPATIENT
Start: 2021-08-25 | End: 2021-09-01

## 2021-08-25 NOTE — PROGRESS NOTES
HPI:    Patient ID: Viri Albrecht is a 68year old male. HPI  Patient is here with redness and a lump in the right lower abdominal/groin area. Is been there for 3 days. No precipitating trauma or change in activity. It is not painful.   He has been t daily. 180 capsule 11   • Rosuvastatin Calcium 10 MG Oral Tab Take 1 tablet (10 mg total) by mouth daily. 90 tablet 0   • aspirin EC 81 MG Oral Tab EC Take 81 mg by mouth.      • Fluocinolone Acetonide 0.01 % External Solution      • gabapentin 100 MG Oral

## 2021-08-25 NOTE — TELEPHONE ENCOUNTER
Action Requested: Summary for Provider     []  Critical Lab, Recommendations Needed  [] Need Additional Advice  []   FYI    []   Need Orders  [] Need Medications Sent to Pharmacy  []  Other     SUMMARY: Appointment made for today  8/25/21    The patient st

## 2021-08-31 ENCOUNTER — TELEPHONE (OUTPATIENT)
Dept: INTERNAL MEDICINE CLINIC | Facility: CLINIC | Age: 81
End: 2021-08-31

## 2021-08-31 NOTE — TELEPHONE ENCOUNTER
Lois AMOS  to Brandyn Urrutia MD    [facility]    8/31/21 1:43 PM  Hello:     In my last visit you prescribed CEFADROXIL two capsul a day. Total capsuls were 14. Today at 10.30 am I took  the 13th capsul and the last one will take tonight (8/31/2021) at 10. 3

## 2021-08-31 NOTE — TELEPHONE ENCOUNTER
Patient sent [employer] message below and also called. Patient states his last dose of antibiotics is tonight. There is still a small lump remaining on right groin area. Please advise.

## 2021-09-01 NOTE — TELEPHONE ENCOUNTER
Spoke with patient, (  verified ) informed of 's   instructions below    Patient verbalizes understanding and agrees.      Advised to call back with any questions/ concerns

## 2021-09-23 ENCOUNTER — LAB ENCOUNTER (OUTPATIENT)
Dept: LAB | Facility: HOSPITAL | Age: 81
End: 2021-09-23
Attending: INTERNAL MEDICINE
Payer: MEDICARE

## 2021-09-23 DIAGNOSIS — Z12.5 SCREENING FOR PROSTATE CANCER: ICD-10-CM

## 2021-09-23 LAB
ALBUMIN SERPL-MCNC: 3.6 G/DL (ref 3.4–5)
ALBUMIN/GLOB SERPL: 0.9 {RATIO} (ref 1–2)
ALP LIVER SERPL-CCNC: 89 U/L
ALT SERPL-CCNC: 16 U/L
ANION GAP SERPL CALC-SCNC: 5 MMOL/L (ref 0–18)
AST SERPL-CCNC: 14 U/L (ref 15–37)
BASOPHILS # BLD AUTO: 0.08 X10(3) UL (ref 0–0.2)
BASOPHILS NFR BLD AUTO: 0.5 %
BILIRUB SERPL-MCNC: 0.8 MG/DL (ref 0.1–2)
BUN BLD-MCNC: 20 MG/DL (ref 7–18)
BUN/CREAT SERPL: 22 (ref 10–20)
CALCIUM BLD-MCNC: 8.4 MG/DL (ref 8.5–10.1)
CHLORIDE SERPL-SCNC: 108 MMOL/L (ref 98–112)
CHOLEST SERPL-MCNC: 96 MG/DL (ref ?–200)
CO2 SERPL-SCNC: 28 MMOL/L (ref 21–32)
COMPLEXED PSA SERPL-MCNC: 2.34 NG/ML (ref ?–4)
CREAT BLD-MCNC: 0.91 MG/DL
DEPRECATED RDW RBC AUTO: 43.8 FL (ref 35.1–46.3)
EOSINOPHIL # BLD AUTO: 0.21 X10(3) UL (ref 0–0.7)
EOSINOPHIL NFR BLD AUTO: 1.3 %
ERYTHROCYTE [DISTWIDTH] IN BLOOD BY AUTOMATED COUNT: 13.2 % (ref 11–15)
GLOBULIN PLAS-MCNC: 4 G/DL (ref 2.8–4.4)
GLUCOSE BLD-MCNC: 101 MG/DL (ref 70–99)
HCT VFR BLD AUTO: 41.2 %
HDLC SERPL-MCNC: 41 MG/DL (ref 40–59)
HGB BLD-MCNC: 12.7 G/DL
IMM GRANULOCYTES # BLD AUTO: 0.06 X10(3) UL (ref 0–1)
IMM GRANULOCYTES NFR BLD: 0.4 %
LDLC SERPL CALC-MCNC: 40 MG/DL (ref ?–100)
LYMPHOCYTES # BLD AUTO: 1.84 X10(3) UL (ref 1–4)
LYMPHOCYTES NFR BLD AUTO: 11.8 %
MCH RBC QN AUTO: 27.6 PG (ref 26–34)
MCHC RBC AUTO-ENTMCNC: 30.8 G/DL (ref 31–37)
MCV RBC AUTO: 89.6 FL
MONOCYTES # BLD AUTO: 1.68 X10(3) UL (ref 0.1–1)
MONOCYTES NFR BLD AUTO: 10.7 %
NEUTROPHILS # BLD AUTO: 11.76 X10 (3) UL (ref 1.5–7.7)
NEUTROPHILS # BLD AUTO: 11.76 X10(3) UL (ref 1.5–7.7)
NEUTROPHILS NFR BLD AUTO: 75.3 %
NONHDLC SERPL-MCNC: 55 MG/DL (ref ?–130)
OSMOLALITY SERPL CALC.SUM OF ELEC: 295 MOSM/KG (ref 275–295)
PATIENT FASTING Y/N/NP: YES
PATIENT FASTING Y/N/NP: YES
PLATELET # BLD AUTO: 365 10(3)UL (ref 150–450)
POTASSIUM SERPL-SCNC: 4.2 MMOL/L (ref 3.5–5.1)
PROT SERPL-MCNC: 7.6 G/DL (ref 6.4–8.2)
RBC # BLD AUTO: 4.6 X10(6)UL
SODIUM SERPL-SCNC: 141 MMOL/L (ref 136–145)
TRIGL SERPL-MCNC: 72 MG/DL (ref 30–149)
TSI SER-ACNC: 1.78 MIU/ML (ref 0.36–3.74)
VIT B12 SERPL-MCNC: 177 PG/ML (ref 193–986)
VLDLC SERPL CALC-MCNC: 10 MG/DL (ref 0–30)
WBC # BLD AUTO: 15.6 X10(3) UL (ref 4–11)

## 2021-09-23 PROCEDURE — 84443 ASSAY THYROID STIM HORMONE: CPT | Performed by: INTERNAL MEDICINE

## 2021-09-23 PROCEDURE — 36415 COLL VENOUS BLD VENIPUNCTURE: CPT | Performed by: INTERNAL MEDICINE

## 2021-09-23 PROCEDURE — 85025 COMPLETE CBC W/AUTO DIFF WBC: CPT | Performed by: INTERNAL MEDICINE

## 2021-09-23 PROCEDURE — 82607 VITAMIN B-12: CPT | Performed by: INTERNAL MEDICINE

## 2021-09-23 PROCEDURE — 80053 COMPREHEN METABOLIC PANEL: CPT | Performed by: INTERNAL MEDICINE

## 2021-09-23 PROCEDURE — 80061 LIPID PANEL: CPT | Performed by: INTERNAL MEDICINE

## 2021-11-13 RX ORDER — ROSUVASTATIN CALCIUM 10 MG/1
10 TABLET, COATED ORAL DAILY
Qty: 90 TABLET | Refills: 1 | Status: SHIPPED | OUTPATIENT
Start: 2021-11-13

## 2021-11-14 NOTE — TELEPHONE ENCOUNTER
Refill passed per [facility] protocol   Requested Prescriptions   Pending Prescriptions Disp Refills    ROSUVASTATIN 10 MG Oral Tab [Pharmacy Med Name: Rosuvastatin Calcium 10 MG Oral Tablet] 90 tablet 0     Sig: Take 1 tablet by mouth once daily

## 2021-11-24 ENCOUNTER — IMMUNIZATION (OUTPATIENT)
Dept: LAB | Facility: HOSPITAL | Age: 81
End: 2021-11-24
Attending: EMERGENCY MEDICINE
Payer: MEDICARE

## 2021-11-24 DIAGNOSIS — Z23 NEED FOR VACCINATION: Primary | ICD-10-CM

## 2021-11-24 PROCEDURE — 0004A SARSCOV2 VAC 30MCG/0.3ML IM: CPT

## 2021-12-09 RX ORDER — TEMAZEPAM 15 MG/1
15 CAPSULE ORAL NIGHTLY PRN
Qty: 90 CAPSULE | Refills: 0 | Status: SHIPPED | OUTPATIENT
Start: 2021-12-09

## 2021-12-27 ENCOUNTER — TELEPHONE (OUTPATIENT)
Dept: INTERNAL MEDICINE CLINIC | Facility: CLINIC | Age: 81
End: 2021-12-27

## 2021-12-27 ENCOUNTER — PATIENT MESSAGE (OUTPATIENT)
Dept: INTERNAL MEDICINE CLINIC | Facility: CLINIC | Age: 81
End: 2021-12-27

## 2021-12-27 DIAGNOSIS — Z20.822 EXPOSURE TO COVID-19 VIRUS: Primary | ICD-10-CM

## 2021-12-27 NOTE — TELEPHONE ENCOUNTER
See acute encounter 12/27/21. From: Steven Aldridge  To: Andi Gonzalez MD  Sent: 12/27/2021  7:54 AM CST  Subject: Rapid Covid test    Hello:    I have all the Covid shots including booster shot. But last night I notice running nose and little cough.  I

## 2021-12-27 NOTE — TELEPHONE ENCOUNTER
----- Message from [employer] Sharron. Sonny sent at 12/27/2021  7:54 AM CST -----  Regarding: Rapid Covid test  Hello:    I have all the Covid shots including booster shot. But last night I notice running nose and little cough.  I called [facility] for Covid te

## 2021-12-27 NOTE — TELEPHONE ENCOUNTER
Call placed to patient. Patient identity confirmed by name and date of birth  Patient endorses runny nose and cough x 2 days. Uncertain of Covid exposure. Desires testing. Order placed per protocol.   Reviewed can/should schedule video visit if symptoms wo

## 2021-12-28 ENCOUNTER — NURSE ONLY (OUTPATIENT)
Dept: LAB | Facility: HOSPITAL | Age: 81
End: 2021-12-28
Attending: INTERNAL MEDICINE
Payer: MEDICARE

## 2021-12-28 DIAGNOSIS — Z20.822 EXPOSURE TO COVID-19 VIRUS: ICD-10-CM

## 2021-12-30 ENCOUNTER — PATIENT MESSAGE (OUTPATIENT)
Dept: INTERNAL MEDICINE CLINIC | Facility: CLINIC | Age: 81
End: 2021-12-30

## 2021-12-31 LAB — SARS-COV-2 RNA RESP QL NAA+PROBE: DETECTED

## 2021-12-31 NOTE — TELEPHONE ENCOUNTER
From: Cali Sanabria  Sent: 12/31/2021 7:15 AM CST  To: Em Rn Triage  Subject: Rapid Covid test    So I got the covid result and is positive. I am already taking cough syrup Robitissin and day quil. I am feeling better.  No fever and little cough with littl

## 2022-01-20 ENCOUNTER — TELEPHONE (OUTPATIENT)
Dept: GASTROENTEROLOGY | Facility: CLINIC | Age: 82
End: 2022-01-20

## 2022-01-20 NOTE — TELEPHONE ENCOUNTER
Pt called in with [facility] on the line stating that medication is too expensive PENTASA 500 MG Oral Cap CR wondering if there is an alternative medication.  Please follow up with pt

## 2022-01-20 NOTE — TELEPHONE ENCOUNTER
I spoke to [facility] with [employer] ph # [phone]. She said no PA needed and would go through as paid claim. Discussed that patient has been on drug for years and is too expensive.   She said that I could file a \"care exception\" which takes 72 hours

## 2022-01-20 NOTE — TELEPHONE ENCOUNTER
I faxed Clinicals and completed form for cost sharing tier exception to [employer] Fax # [phone] - await determination. Reference Number: [id number].

## 2022-01-21 NOTE — TELEPHONE ENCOUNTER
Dr. Sarah Larsen    Patient's Pentasa went up from about $200 a month to over $500 per month.  (likely because it does not require prior authorization but is a tier 3 copay)    I tried to do a cost-sharing tier exception to make the medication less costly, but th

## 2022-01-26 NOTE — TELEPHONE ENCOUNTER
See patient message dated 1/24/2022. Patient aware that tier exception not authorized, would have to try and fail apriso first.  Aware Dr. Clemencia Vitale offered to send script and to follow up in office after if he were to start.   Aware Dr. Clemencia Vitale didn't want him

## 2022-02-01 ENCOUNTER — NURSE TRIAGE (OUTPATIENT)
Dept: INTERNAL MEDICINE CLINIC | Facility: CLINIC | Age: 82
End: 2022-02-01

## 2022-02-01 ENCOUNTER — PATIENT MESSAGE (OUTPATIENT)
Dept: INTERNAL MEDICINE CLINIC | Facility: CLINIC | Age: 82
End: 2022-02-01

## 2022-02-01 NOTE — TELEPHONE ENCOUNTER
From: Zakia Almanzar  To: Kellee Thomas MD  Sent: 2/1/2022 4:13 PM CST  Subject: Urine Test    Hello: For last two days I am urinating very frequently. Please issue an order to lab for Urine test.I will be in Hospital on Thursday to see Dr Clemencia Vitale and would like to have lab test on the same visit. I do not have any burning or any blood in Urine.     Thanks,    [employer]

## 2022-02-01 NOTE — TELEPHONE ENCOUNTER
----- Message from Via [employer]. Sonny sent at 2/1/2022  4:13 PM CST -----  Regarding: Urine Test  Hello: For last two days I am urinating very frequently. Please issue an order to lab for Urine test.I will be in Hospital on Thursday to see Dr Thee Correa and would like to have lab test on the same visit. I do not have any burning or any blood in Urine.     Thanks,    [employer]

## 2022-02-08 ENCOUNTER — PATIENT MESSAGE (OUTPATIENT)
Dept: GASTROENTEROLOGY | Facility: CLINIC | Age: 82
End: 2022-02-08

## 2022-02-08 NOTE — TELEPHONE ENCOUNTER
Please advise on refill request below. Thank you.     LV 08/05/2021 with Dr. Annie Berger refilled on 08/05/2021    Next appointment scheduled for 04/21/2022 with Dr. Terrence Irvin

## 2022-02-08 NOTE — TELEPHONE ENCOUNTER
From: Hi Soto  To: Alexsandra Gleason. Janett Gomes MD  Sent: 2/8/2022 2:27 PM CST  Subject: Kasie Sanchez    Please write a prescription of Apriso to [facility]. Presently I take 3000 mg of Pentasa. I think 240 capsule of 375 mg of Apriso will be equivalent to what I take.     Mele Apple

## 2022-02-09 RX ORDER — MESALAMINE 375 MG/1
1.5 CAPSULE, EXTENDED RELEASE ORAL EVERY MORNING
Qty: 120 CAPSULE | Refills: 11 | Status: SHIPPED | OUTPATIENT
Start: 2022-02-09 | End: 2023-02-04

## 2022-02-09 NOTE — TELEPHONE ENCOUNTER
Discussed the need to change the new medication, will switch to Apriso at 4 tablets once daily. This was discussed with the patient. Insurance would not pay for the Pentasa anymore. To call with any issues or questions and I have sent over the prescription to his pharmacy.

## 2022-02-14 ENCOUNTER — PATIENT MESSAGE (OUTPATIENT)
Dept: INTERNAL MEDICINE CLINIC | Facility: CLINIC | Age: 82
End: 2022-02-14

## 2022-02-14 ENCOUNTER — LAB ENCOUNTER (OUTPATIENT)
Dept: LAB | Facility: HOSPITAL | Age: 82
End: 2022-02-14
Attending: INTERNAL MEDICINE
Payer: MEDICARE

## 2022-02-14 LAB
BILIRUB UR QL: NEGATIVE
CLARITY UR: CLEAR
COLOR UR: YELLOW
GLUCOSE UR-MCNC: NEGATIVE MG/DL
HGB UR QL STRIP.AUTO: NEGATIVE
KETONES UR-MCNC: NEGATIVE MG/DL
LEUKOCYTE ESTERASE UR QL STRIP.AUTO: NEGATIVE
NITRITE UR QL STRIP.AUTO: NEGATIVE
PROT UR-MCNC: 30 MG/DL
SP GR UR STRIP: 1.03 (ref 1–1.03)
UROBILINOGEN UR STRIP-ACNC: <2

## 2022-02-14 PROCEDURE — 81001 URINALYSIS AUTO W/SCOPE: CPT | Performed by: INTERNAL MEDICINE

## 2022-02-14 NOTE — TELEPHONE ENCOUNTER
Margie Acosta RN 2/14/2022 3:10 PM CST        ----- Message -----  From: Linda Rubin  Sent: 2/14/2022 3:05 PM CST  To: Em Rn Triage  Subject: Urine test     Hello:    Protein Urine is 30mg. What does it mean.     [facility]

## 2022-03-02 ENCOUNTER — LAB ENCOUNTER (OUTPATIENT)
Dept: LAB | Facility: HOSPITAL | Age: 82
End: 2022-03-02
Attending: INTERNAL MEDICINE
Payer: MEDICARE

## 2022-03-02 PROCEDURE — 81001 URINALYSIS AUTO W/SCOPE: CPT | Performed by: INTERNAL MEDICINE

## 2022-03-08 RX ORDER — TEMAZEPAM 15 MG/1
15 CAPSULE ORAL NIGHTLY PRN
Qty: 90 CAPSULE | Refills: 0 | Status: SHIPPED | OUTPATIENT
Start: 2022-03-08

## 2022-03-24 ENCOUNTER — TELEPHONE (OUTPATIENT)
Dept: GASTROENTEROLOGY | Facility: CLINIC | Age: 82
End: 2022-03-24

## 2022-03-24 ENCOUNTER — OFFICE VISIT (OUTPATIENT)
Dept: GASTROENTEROLOGY | Facility: CLINIC | Age: 82
End: 2022-03-24
Payer: COMMERCIAL

## 2022-03-24 VITALS
HEART RATE: 73 BPM | HEIGHT: 69 IN | SYSTOLIC BLOOD PRESSURE: 114 MMHG | TEMPERATURE: 99 F | DIASTOLIC BLOOD PRESSURE: 65 MMHG | WEIGHT: 126 LBS | BODY MASS INDEX: 18.66 KG/M2

## 2022-03-24 DIAGNOSIS — K86.2 PANCREAS CYST: Primary | ICD-10-CM

## 2022-03-24 PROCEDURE — 3074F SYST BP LT 130 MM HG: CPT | Performed by: INTERNAL MEDICINE

## 2022-03-24 PROCEDURE — 3078F DIAST BP <80 MM HG: CPT | Performed by: INTERNAL MEDICINE

## 2022-03-24 PROCEDURE — 99213 OFFICE O/P EST LOW 20 MIN: CPT | Performed by: INTERNAL MEDICINE

## 2022-03-24 PROCEDURE — 3008F BODY MASS INDEX DOCD: CPT | Performed by: INTERNAL MEDICINE

## 2022-03-24 NOTE — PATIENT INSTRUCTIONS
Ulcerative proctitis   - continue on medication  - colonoscopy with Golytely and MAC    Pancreatic cysts  - MRI scan June     Weight loss  - let Dr. Danis Yanes know

## 2022-03-24 NOTE — TELEPHONE ENCOUNTER
Scheduled for:  Colonoscopy 98826  Provider Name:  Dr Terrence Irvin   Date:  8/18/2022  Location:  [facility]  Sedation:  MAC  Time:  8:45am (pt is aware to arrive at 7:45am)  Prep:  Golytely  Meds/Allergies Reconciled?:  Physician reviewed   Diagnosis with codes:  Ulcreative Proctitis K51.20 Pareatic cyst K86.2   Was patient informed to call insurance with codes (Y/N):  Yes  Referral sent?:  Referral was sent at the time of electronic surgical scheduling. [facility] or [facility] notified?:  I sent an electronic request to Endo Scheduling and received a confirmation today. Medication Orders:   This patient verbally confirmed that he is not taking:  Iron, blood thinners and is not diabetic  Not latex allergy, Not PCN allergy and does not have a pacemaker  Patient is aware to HOLD Losartan the day of the procedure   Pt is aware to NOT take iron pills, herbal meds and diet supplements for 7 days before exam. Also to NOT take any form of alcohol, recreational drugs and any forms of ED meds 24 hours before exam.       Misc Orders:  Patient is aware that the ENDO dept will call to schedule a COVID 19 test before the procedure      Further instructions given by staff:   I discussed the prep instructions with the patient at the time of the appointment which he verbally understood

## 2022-03-24 NOTE — TELEPHONE ENCOUNTER
Patient wanted to know if he needed a prior authorization prior to the MRI. I let him know that we have a team (managed care) that works on the authorization for imaging and if there is any issue with obtaining authorization he is notified. I let him know that patients are advised to contact their insurance to see what their out of pocket commitment is or if they need to meet a deductible first to avoid unexpected out of pocket costs. All questions answered.

## 2022-04-06 RX ORDER — LOSARTAN POTASSIUM AND HYDROCHLOROTHIAZIDE 12.5; 1 MG/1; MG/1
1 TABLET ORAL DAILY
Qty: 90 TABLET | Refills: 1 | Status: SHIPPED | OUTPATIENT
Start: 2022-04-06

## 2022-04-07 NOTE — TELEPHONE ENCOUNTER
Refill passed per [facility] protocol.    Requested Prescriptions   Pending Prescriptions Disp Refills    LOSARTAN POTASSIUM-HCTZ 100-12.5 MG Oral Tab [Pharmacy Med Name: Losartan Potassium-HCTZ 100-12.5 MG Oral Tablet] 90 tablet 0     Sig: Take 1 tablet by mouth once daily        Hypertensive Medications Protocol Passed - 4/6/2022  8:06 PM        Passed - CMP or BMP in past 12 months        Passed - Appointment in past 6 or next 3 months        Passed - GFR Non- > 50     Lab Results   Component Value Date    GFRNAA 80 09/23/2021                     Recent Outpatient Visits              1 week ago Pancreas cyst    [facility], [address] [facility], Desirae Enriquez MD    Office Visit    3 months ago Exposure to COVID-19 virus    [address] (Indoor Clinic)    Nurse Only    7 months ago Cellulitis of abdominal wall    [facility], [facility], [address], Rosita De MD    Office Visit    7 months ago Encounter for annual health examination    [facility], Rosita De MD    Office Visit    8 months ago Lesion of pancreas    [facility], [facility], [address] [facility], Desirae Enriquez MD    Office Visit          Future Appointments         Provider Department Appt Notes    In 3 days Lucero Patton MD [facility], [facility], [address] [facility] follow up medication managment **Encourage pt to artur VELASQUEZ PX    In 3 weeks Grazyna Manriquez MD Cardiology - [facility]     In 4 months [facility], PROCEDURE [address] GI PROCEDURE [email or web address]

## 2022-04-09 ENCOUNTER — HOSPITAL ENCOUNTER (OUTPATIENT)
Dept: GENERAL RADIOLOGY | Facility: HOSPITAL | Age: 82
Discharge: HOME | End: 2022-04-09
Attending: INTERNAL MEDICINE
Payer: MEDICARE

## 2022-04-09 ENCOUNTER — OFFICE VISIT (OUTPATIENT)
Dept: INTERNAL MEDICINE CLINIC | Facility: CLINIC | Age: 82
End: 2022-04-09
Payer: COMMERCIAL

## 2022-04-09 ENCOUNTER — LAB ENCOUNTER (OUTPATIENT)
Dept: LAB | Facility: HOSPITAL | Age: 82
End: 2022-04-09
Attending: INTERNAL MEDICINE
Payer: MEDICARE

## 2022-04-09 VITALS
RESPIRATION RATE: 18 BRPM | WEIGHT: 124 LBS | HEIGHT: 69 IN | DIASTOLIC BLOOD PRESSURE: 60 MMHG | TEMPERATURE: 97 F | HEART RATE: 74 BPM | BODY MASS INDEX: 18.37 KG/M2 | SYSTOLIC BLOOD PRESSURE: 126 MMHG

## 2022-04-09 DIAGNOSIS — E53.8 B12 DEFICIENCY: ICD-10-CM

## 2022-04-09 DIAGNOSIS — I10 ESSENTIAL HYPERTENSION: Primary | ICD-10-CM

## 2022-04-09 DIAGNOSIS — Z86.16 HISTORY OF COVID-19: ICD-10-CM

## 2022-04-09 DIAGNOSIS — R07.81 RIB PAIN: ICD-10-CM

## 2022-04-09 DIAGNOSIS — R20.9 COLD EXTREMITIES: ICD-10-CM

## 2022-04-09 LAB
BASOPHILS # BLD AUTO: 0.11 X10(3) UL (ref 0–0.2)
BASOPHILS NFR BLD AUTO: 1 %
DEPRECATED RDW RBC AUTO: 42.3 FL (ref 35.1–46.3)
EOSINOPHIL # BLD AUTO: 0.1 X10(3) UL (ref 0–0.7)
EOSINOPHIL NFR BLD AUTO: 0.9 %
ERYTHROCYTE [DISTWIDTH] IN BLOOD BY AUTOMATED COUNT: 13.2 % (ref 11–15)
HCT VFR BLD AUTO: 40.5 %
HGB BLD-MCNC: 12.5 G/DL
IMM GRANULOCYTES # BLD AUTO: 0.05 X10(3) UL (ref 0–1)
IMM GRANULOCYTES NFR BLD: 0.5 %
LYMPHOCYTES # BLD AUTO: 1.32 X10(3) UL (ref 1–4)
LYMPHOCYTES NFR BLD AUTO: 12.5 %
MCH RBC QN AUTO: 27.3 PG (ref 26–34)
MCHC RBC AUTO-ENTMCNC: 30.9 G/DL (ref 31–37)
MCV RBC AUTO: 88.4 FL
MONOCYTES # BLD AUTO: 0.91 X10(3) UL (ref 0.1–1)
MONOCYTES NFR BLD AUTO: 8.6 %
NEUTROPHILS # BLD AUTO: 8.11 X10 (3) UL (ref 1.5–7.7)
NEUTROPHILS # BLD AUTO: 8.11 X10(3) UL (ref 1.5–7.7)
NEUTROPHILS NFR BLD AUTO: 76.5 %
PLATELET # BLD AUTO: 362 10(3)UL (ref 150–450)
RBC # BLD AUTO: 4.58 X10(6)UL
VIT B12 SERPL-MCNC: 236 PG/ML (ref 193–986)
WBC # BLD AUTO: 10.6 X10(3) UL (ref 4–11)

## 2022-04-09 PROCEDURE — 71046 X-RAY EXAM CHEST 2 VIEWS: CPT | Performed by: INTERNAL MEDICINE

## 2022-04-09 PROCEDURE — 3074F SYST BP LT 130 MM HG: CPT | Performed by: INTERNAL MEDICINE

## 2022-04-09 PROCEDURE — 82607 VITAMIN B-12: CPT | Performed by: INTERNAL MEDICINE

## 2022-04-09 PROCEDURE — 85025 COMPLETE CBC W/AUTO DIFF WBC: CPT | Performed by: INTERNAL MEDICINE

## 2022-04-09 PROCEDURE — 36415 COLL VENOUS BLD VENIPUNCTURE: CPT | Performed by: INTERNAL MEDICINE

## 2022-04-09 PROCEDURE — 3078F DIAST BP <80 MM HG: CPT | Performed by: INTERNAL MEDICINE

## 2022-04-09 PROCEDURE — 3008F BODY MASS INDEX DOCD: CPT | Performed by: INTERNAL MEDICINE

## 2022-04-09 PROCEDURE — 99214 OFFICE O/P EST MOD 30 MIN: CPT | Performed by: INTERNAL MEDICINE

## 2022-04-09 RX ORDER — ROSUVASTATIN CALCIUM 10 MG/1
10 TABLET, COATED ORAL DAILY
Qty: 90 TABLET | Refills: 1 | Status: SHIPPED | OUTPATIENT
Start: 2022-04-09

## 2022-04-09 RX ORDER — ERGOCALCIFEROL 1.25 MG/1
50000 CAPSULE ORAL WEEKLY
Qty: 12 CAPSULE | Refills: 1 | Status: SHIPPED | OUTPATIENT
Start: 2022-04-09

## 2022-05-23 ENCOUNTER — TELEPHONE (OUTPATIENT)
Dept: GASTROENTEROLOGY | Facility: CLINIC | Age: 82
End: 2022-05-23

## 2022-05-23 NOTE — TELEPHONE ENCOUNTER
1st reminder letter mailed to patient regarding her overdue Imaging order:         MRI ABDOMEN (W+WO) (NIX=[id number])

## 2022-06-06 RX ORDER — TEMAZEPAM 15 MG/1
15 CAPSULE ORAL NIGHTLY PRN
Qty: 90 CAPSULE | Refills: 0 | Status: SHIPPED | OUTPATIENT
Start: 2022-06-06

## 2022-06-06 NOTE — TELEPHONE ENCOUNTER
Please review; protocol failed/No Protcol    Requested Prescriptions   Pending Prescriptions Disp Refills    temazepam 15 MG Oral Cap 90 capsule 0     Sig: Take 1 capsule (15 mg total) by mouth nightly as needed.         There is no refill protocol information for this order           Recent Outpatient Visits              1 month ago Essential hypertension    [facility], [facility], [address] [place] [address] Rd,[address], Alfreda Araujo MD    Office Visit    2 months ago Pancreas cyst    [facility], [facility], [facility], Renan Herrera MD    Office Visit    5 months ago Exposure to COVID-19 virus    [address] [facility]    Nurse Only    9 months ago Cellulitis of abdominal wall    Santino Pollard [place], MD    Office Visit    9 months ago Encounter for annual health examination    John Rivers MD    Office Visit            Future Appointments         Provider Department Appt Notes    In 2 months [facility], PROCEDURE [address] GI PROCEDURE [email or web address]

## 2022-06-17 RX ORDER — METOPROLOL SUCCINATE 25 MG/1
25 TABLET, EXTENDED RELEASE ORAL DAILY
Qty: 90 TABLET | Refills: 1 | Status: SHIPPED | OUTPATIENT
Start: 2022-06-17

## 2022-08-24 ENCOUNTER — TELEPHONE (OUTPATIENT)
Dept: INTERNAL MEDICINE CLINIC | Facility: CLINIC | Age: 82
End: 2022-08-24

## 2022-08-25 NOTE — TELEPHONE ENCOUNTER
Spoke to patient for medication adherence consult. Patient is overdue for refill on rosuvastatin per insurance report. Patient endorses that he has been tolerating all of his medications without issue. Patient reports that he is out of town for the next few months. Patient states that he takes rosuvastatin 10 mg each day. Patient reports no issues in forgetting to take his medication. Patient filled a 90 day supply of medication on 4/7 and states that he has approximately a month of medication left and is not ready to refill his medication. Patient unsure how he has medication left. Provided education on the importance of adherence to lower cholesterol and prevent any cardiovascular events,     Patient last filled losartan-hydrochlorothiazide on 4/7 as well for 90 day supply. Patient reports no issues in taking this medication. Patient states that he has an adequate supply of this medication, as well. Patient reports that he takes his medication daily. Patient is not ready for a refill. Provided education to patient on the importance of adherence and maintaining BP control. Patient verbalized understanding of the above and denies any other questions or concerns at this time.

## 2023-02-21 ENCOUNTER — TELEPHONE (OUTPATIENT)
Dept: INTERNAL MEDICINE CLINIC | Facility: CLINIC | Age: 83
End: 2023-02-21

## 2023-02-21 RX ORDER — CYANOCOBALAMIN 1000 UG/ML
1000 INJECTION, SOLUTION INTRAMUSCULAR; SUBCUTANEOUS
COMMUNITY
Start: 2022-10-13

## 2023-02-21 RX ORDER — MESALAMINE 375 MG/1
1.5 CAPSULE, EXTENDED RELEASE ORAL DAILY
COMMUNITY
Start: 2023-02-14

## 2023-02-21 NOTE — TELEPHONE ENCOUNTER
Spoke to patient for medication adherence consult. Patient failed adherence measure for losartan-hydrochlorothiazide and rosuvastatin for 2022 per insurance report. Patient has been following up with a new PCP near [facility] where he is now living. Patient reports that he is taking both of these medications every day, as prescribed without missing doses. Patient reports that he is tolerating his medications well. Reconciled outside medications and added cyanocobalamin monthly injection and Apriso to patient's medication list.    Provided education on importance of adherence to medication for optimal benefit and prevention of complications. Patient denies any questions or concerns with medication.

## 2023-03-15 ENCOUNTER — TELEPHONE (OUTPATIENT)
Facility: CLINIC | Age: 83
End: 2023-03-15

## 2023-03-23 ENCOUNTER — TELEPHONE (OUTPATIENT)
Dept: NEUROLOGY | Facility: CLINIC | Age: 83
End: 2023-03-23

## 2023-05-12 ENCOUNTER — TELEPHONE (OUTPATIENT)
Dept: INTERNAL MEDICINE CLINIC | Facility: CLINIC | Age: 83
End: 2023-05-12

## 2023-05-12 NOTE — TELEPHONE ENCOUNTER
Called to schedule annual MA PX. He is out of town and returning end of June and will call to schedule.

## 2023-08-07 ENCOUNTER — OFFICE VISIT (OUTPATIENT)
Facility: CLINIC | Age: 83
End: 2023-08-07

## 2023-08-07 VITALS
HEART RATE: 86 BPM | SYSTOLIC BLOOD PRESSURE: 128 MMHG | OXYGEN SATURATION: 97 % | DIASTOLIC BLOOD PRESSURE: 50 MMHG | WEIGHT: 125 LBS | BODY MASS INDEX: 18.51 KG/M2 | HEIGHT: 69 IN

## 2023-08-07 DIAGNOSIS — I10 ESSENTIAL HYPERTENSION: ICD-10-CM

## 2023-08-07 DIAGNOSIS — K51.20 ULCERATIVE PROCTITIS WITHOUT COMPLICATION (HCC): ICD-10-CM

## 2023-08-07 DIAGNOSIS — Z00.00 ENCOUNTER FOR ANNUAL HEALTH EXAMINATION: Primary | ICD-10-CM

## 2023-08-07 DIAGNOSIS — K86.9 PANCREATIC LESION: ICD-10-CM

## 2023-08-07 DIAGNOSIS — I25.10 CORONARY ARTERY DISEASE INVOLVING NATIVE CORONARY ARTERY OF NATIVE HEART WITHOUT ANGINA PECTORIS: ICD-10-CM

## 2023-08-07 DIAGNOSIS — Z12.5 SCREENING FOR PROSTATE CANCER: ICD-10-CM

## 2023-08-07 DIAGNOSIS — E53.8 B12 DEFICIENCY: ICD-10-CM

## 2023-08-07 DIAGNOSIS — E78.5 HYPERLIPIDEMIA, UNSPECIFIED HYPERLIPIDEMIA TYPE: ICD-10-CM

## 2023-08-07 RX ORDER — ASPIRIN 81 MG/1
81 TABLET, CHEWABLE ORAL DAILY
COMMUNITY

## 2023-08-07 RX ORDER — TEMAZEPAM 15 MG/1
15 CAPSULE ORAL NIGHTLY PRN
Qty: 120 CAPSULE | Refills: 0 | Status: SHIPPED | OUTPATIENT
Start: 2023-08-07

## 2023-08-07 RX ORDER — CYANOCOBALAMIN 1000 UG/ML
1000 INJECTION, SOLUTION INTRAMUSCULAR; SUBCUTANEOUS
Qty: 1 ML | Refills: 12 | Status: SHIPPED | OUTPATIENT
Start: 2023-08-07

## 2023-08-08 ENCOUNTER — LAB ENCOUNTER (OUTPATIENT)
Dept: LAB | Facility: HOSPITAL | Age: 83
End: 2023-08-08
Attending: INTERNAL MEDICINE
Payer: MEDICARE

## 2023-08-08 DIAGNOSIS — Z12.5 SCREENING FOR PROSTATE CANCER: ICD-10-CM

## 2023-08-08 LAB
ALBUMIN SERPL-MCNC: 3.7 G/DL (ref 3.4–5)
ALBUMIN/GLOB SERPL: 1 {RATIO} (ref 1–2)
ALP LIVER SERPL-CCNC: 89 U/L
ALT SERPL-CCNC: 21 U/L
ANION GAP SERPL CALC-SCNC: 7 MMOL/L (ref 0–18)
AST SERPL-CCNC: 20 U/L (ref 15–37)
BASOPHILS # BLD AUTO: 0.08 X10(3) UL (ref 0–0.2)
BASOPHILS NFR BLD AUTO: 0.5 %
BILIRUB SERPL-MCNC: 1 MG/DL (ref 0.1–2)
BUN BLD-MCNC: 22 MG/DL (ref 7–18)
BUN/CREAT SERPL: 20.6 (ref 10–20)
CALCIUM BLD-MCNC: 8.9 MG/DL (ref 8.5–10.1)
CHLORIDE SERPL-SCNC: 107 MMOL/L (ref 98–112)
CHOLEST SERPL-MCNC: 92 MG/DL (ref ?–200)
CO2 SERPL-SCNC: 27 MMOL/L (ref 21–32)
COMPLEXED PSA SERPL-MCNC: 1.95 NG/ML (ref ?–4)
CREAT BLD-MCNC: 1.07 MG/DL
DEPRECATED RDW RBC AUTO: 45.3 FL (ref 35.1–46.3)
EGFRCR SERPLBLD CKD-EPI 2021: 70 ML/MIN/1.73M2 (ref 60–?)
EOSINOPHIL # BLD AUTO: 0.08 X10(3) UL (ref 0–0.7)
EOSINOPHIL NFR BLD AUTO: 0.5 %
ERYTHROCYTE [DISTWIDTH] IN BLOOD BY AUTOMATED COUNT: 14 % (ref 11–15)
FASTING PATIENT LIPID ANSWER: YES
FASTING STATUS PATIENT QL REPORTED: YES
GLOBULIN PLAS-MCNC: 3.7 G/DL (ref 2.8–4.4)
GLUCOSE BLD-MCNC: 113 MG/DL (ref 70–99)
HCT VFR BLD AUTO: 38.3 %
HDLC SERPL-MCNC: 42 MG/DL (ref 40–59)
HGB BLD-MCNC: 11.9 G/DL
IMM GRANULOCYTES # BLD AUTO: 0.11 X10(3) UL (ref 0–1)
IMM GRANULOCYTES NFR BLD: 0.7 %
LDLC SERPL CALC-MCNC: 33 MG/DL (ref ?–100)
LYMPHOCYTES # BLD AUTO: 1.74 X10(3) UL (ref 1–4)
LYMPHOCYTES NFR BLD AUTO: 11.3 %
MCH RBC QN AUTO: 27.3 PG (ref 26–34)
MCHC RBC AUTO-ENTMCNC: 31.1 G/DL (ref 31–37)
MCV RBC AUTO: 87.8 FL
MONOCYTES # BLD AUTO: 1.79 X10(3) UL (ref 0.1–1)
MONOCYTES NFR BLD AUTO: 11.7 %
NEUTROPHILS # BLD AUTO: 11.56 X10 (3) UL (ref 1.5–7.7)
NEUTROPHILS # BLD AUTO: 11.56 X10(3) UL (ref 1.5–7.7)
NEUTROPHILS NFR BLD AUTO: 75.3 %
NONHDLC SERPL-MCNC: 50 MG/DL (ref ?–130)
OSMOLALITY SERPL CALC.SUM OF ELEC: 296 MOSM/KG (ref 275–295)
PLATELET # BLD AUTO: 365 10(3)UL (ref 150–450)
POTASSIUM SERPL-SCNC: 4.1 MMOL/L (ref 3.5–5.1)
PROT SERPL-MCNC: 7.4 G/DL (ref 6.4–8.2)
RBC # BLD AUTO: 4.36 X10(6)UL
SODIUM SERPL-SCNC: 141 MMOL/L (ref 136–145)
TRIGL SERPL-MCNC: 83 MG/DL (ref 30–149)
TSI SER-ACNC: 1.78 MIU/ML (ref 0.36–3.74)
VIT B12 SERPL-MCNC: 431 PG/ML (ref 193–986)
VLDLC SERPL CALC-MCNC: 11 MG/DL (ref 0–30)
WBC # BLD AUTO: 15.4 X10(3) UL (ref 4–11)

## 2023-08-08 PROCEDURE — 36415 COLL VENOUS BLD VENIPUNCTURE: CPT | Performed by: INTERNAL MEDICINE

## 2023-08-08 PROCEDURE — 80061 LIPID PANEL: CPT | Performed by: INTERNAL MEDICINE

## 2023-08-08 PROCEDURE — 82607 VITAMIN B-12: CPT | Performed by: INTERNAL MEDICINE

## 2023-08-08 PROCEDURE — 84443 ASSAY THYROID STIM HORMONE: CPT | Performed by: INTERNAL MEDICINE

## 2023-08-08 PROCEDURE — 80053 COMPREHEN METABOLIC PANEL: CPT | Performed by: INTERNAL MEDICINE

## 2023-08-08 PROCEDURE — 85025 COMPLETE CBC W/AUTO DIFF WBC: CPT | Performed by: INTERNAL MEDICINE

## 2023-08-08 NOTE — TELEPHONE ENCOUNTER
Dr. Annmarie Fitch,    Patient requesting refill for Apriso. Please prescribe if appropriate, thank you. Last seen:3/24/23.

## 2023-08-08 NOTE — TELEPHONE ENCOUNTER
Current Outpatient Medications   Medication Sig Dispense Refill    APRISO 0.375 g Oral Capsule SR 24 Hr Take 4 capsules (1.5 g total) by mouth daily. Patient requesting refills for Apriso 0.375 g. Please call. Thank you.

## 2023-08-08 NOTE — TELEPHONE ENCOUNTER
Please see who the pt is following up with ??     He has seen another GI doctor back in March, I have not seen in over a year

## 2023-08-09 RX ORDER — MESALAMINE 0.38 G/1
1.5 CAPSULE, EXTENDED RELEASE ORAL EVERY MORNING
Qty: 120 CAPSULE | Refills: 3 | Status: SHIPPED | OUTPATIENT
Start: 2023-08-09

## 2023-08-09 NOTE — TELEPHONE ENCOUNTER
Dr. Terrence Irvin    I spoke to [facility]. Patient wants to have his Gi care here. The last one he saw was in [place] and does not want to go there, too far since he is near [place].  He has first available appointment scheduled with you in November. Would you be willing to fill until he can get in? He states he needs a refill.     Thank you    Your Appointments      Wednesday November 15, 2023 11:40 AM  Exam - Established with MD Henrietta Lomeli, [address] [facility],[address], [facility]) [address] [facility],[address] Floors  [phone]

## 2023-09-05 RX ORDER — TEMAZEPAM 15 MG/1
15 CAPSULE ORAL NIGHTLY PRN
Qty: 120 CAPSULE | Refills: 0 | Status: CANCELLED | OUTPATIENT
Start: 2023-09-05

## 2023-09-06 NOTE — TELEPHONE ENCOUNTER
Disp Refills Start End    temazepam 15 MG Oral Cap 120 capsule 0 8/7/2023     Sig - Route: Take 1 capsule (15 mg total) by mouth nightly as needed.  Can increase to two pills at a time if needed - Oral    Class: Print Script      Print Trail   Printed On Printed By Printed To Report   8/7/23 2:51 PM Nestor Jessica MD [id number] [facility] CONTROLLED MEDS

## 2023-09-07 ENCOUNTER — PATIENT MESSAGE (OUTPATIENT)
Facility: CLINIC | Age: 83
End: 2023-09-07

## 2023-09-07 RX ORDER — TEMAZEPAM 15 MG/1
15 CAPSULE ORAL NIGHTLY PRN
Qty: 120 CAPSULE | Refills: 0 | OUTPATIENT
Start: 2023-09-07

## 2023-09-07 RX ORDER — TEMAZEPAM 15 MG/1
15 CAPSULE ORAL NIGHTLY PRN
Qty: 120 CAPSULE | Refills: 0 | Status: CANCELLED | OUTPATIENT
Start: 2023-09-07

## 2023-09-07 NOTE — TELEPHONE ENCOUNTER
temazepam 15 MG Oral Cap 120 capsule 0 8/7/2023     Sig - Route: Take 1 capsule (15 mg total) by mouth nightly as needed. 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 Usually temezepam helps. Sometimes he has left lower leg pain that keeps him up. He wonders about a higher dose of temezepam; no on 15mg. Otherwise, he is ok. \"      Patient called related to Rx above. He did not get the printed Rx, per patient. He states he has not taken Rx, he only has 2 tablets from a prior Rx left [did not get printed Rx], it helps him sleep and he takes PRN. I made him aware I will send request to ARNIE Morrissey who is covering provider today for Dr. Ilda Ochoa. He would like a phone call when Rx is sent. Patient verbalized understanding. No further questions or concerns at this time.     ARNIE Morrissey to review and authorize, if appropriate  Rx Pended, authorize if appropriate

## 2023-09-08 NOTE — TELEPHONE ENCOUNTER
Manjit Thomas =see Kiara's notes below,pended prescription . Patient was seen on 8/7/23 . temazepam 15 MG Oral Cap 120 capsule 0 8/7/2023       Sig - Route: Take 1 capsule (15 mg total) by mouth nightly as needed. Can increase to two pills at a time if needed - Oral     Class: Print Script        Last office visit was on 8/7/23 with PCP, Dr. Yair Alberts has issues sleeping at night. Usually temezepam helps. Sometimes he has left lower leg pain that keeps him up. He wonders about a higher dose of temezepam; no on 15mg. Otherwise, he is ok. \"        Patient called related to Rx above. He did not get the printed Rx, per patient. He states he has not taken Rx, he only has 2 tablets from a prior Rx left [did not get printed Rx], it helps him sleep and he takes PRN. I made him aware I will send request to ARNIE June who is covering provider today for Dr. Iam Clemons. He would like a phone call when Rx is sent       Dr Iam Clemons denied the refill request today ===see refused reason below. Disp Refills Start End    temazepam 15 MG Oral Cap 120 capsule 0 9/7/2023     Request refused: Appt required, please call patient    Sig - Route: Take 1 capsule (15 mg total) by mouth nightly as needed.  Can increase to two pills at a time if needed - Oral          Future Appointments   Date Time Provider Kayla Iverson   10/4/2023 11:00 AM Mundo Arroyo MD [facility]

## 2023-09-21 ENCOUNTER — PATIENT MESSAGE (OUTPATIENT)
Facility: CLINIC | Age: 83
End: 2023-09-21

## 2023-09-21 RX ORDER — FLUOCINOLONE ACETONIDE 0.1 MG/ML
SOLUTION TOPICAL
Refills: 0 | Status: CANCELLED | OUTPATIENT
Start: 2023-09-21

## 2023-09-22 RX ORDER — LOSARTAN POTASSIUM AND HYDROCHLOROTHIAZIDE 12.5; 1 MG/1; MG/1
1 TABLET ORAL DAILY
Qty: 90 TABLET | Refills: 3 | Status: SHIPPED | OUTPATIENT
Start: 2023-09-22

## 2023-09-22 RX ORDER — METOPROLOL SUCCINATE 25 MG/1
25 TABLET, EXTENDED RELEASE ORAL DAILY
Qty: 90 TABLET | Refills: 3 | Status: SHIPPED | OUTPATIENT
Start: 2023-09-22

## 2023-09-22 RX ORDER — ROSUVASTATIN CALCIUM 10 MG/1
10 TABLET, COATED ORAL DAILY
Qty: 90 TABLET | Refills: 3 | Status: SHIPPED | OUTPATIENT
Start: 2023-09-22

## 2023-09-22 NOTE — TELEPHONE ENCOUNTER
Rx pended        From: Prasad Dennis  To: Tracy Mccarty  Sent: 9/21/2023  9:30 PM CDT  Subject: Refill    Hello:    While submitting Refill request I by mistake requested for FLUOCINOLONE ACETONIDE 0.01% Soln which may be treated as cancelled. Thanks.     Andra Guevara

## 2023-09-22 NOTE — TELEPHONE ENCOUNTER
Refill passed per [facility] protocol. Requested Prescriptions   Pending Prescriptions Disp Refills    rosuvastatin 10 MG Oral Tab 90 tablet 1     Sig: Take 1 tablet (10 mg total) by mouth daily. Cholesterol Medication Protocol Passed - 9/21/2023  9:21 PM        Passed - ALT in past 12 months        Passed - LDL in past 12 months        Passed - Last ALT < 80     Lab Results   Component Value Date    ALT 21 08/08/2023             Passed - Last LDL < 130     Lab Results   Component Value Date    LDL 33 08/08/2023             Passed - In person appointment or virtual visit in the past 12 mos or appointment in next 3 mos     Recent Outpatient Visits              2 weeks ago Primary insomnia    Spencer Nowak MD    Office Visit    1 month ago Encounter for annual health examination    Nicole Bhatti MD    Office Visit    1 year ago Essential hypertension    Nicole De La Garza MD    Office Visit    1 year ago Pancreas cyst    Winston Trevino MD    Office Visit    1 year ago Exposure to COVID-19 virus    [facility])    Nurse Only          Future Appointments         Provider Department Appt Notes    In 1 week Rose Peck MD [address], [facility] Just follow up                      metoprolol succinate ER 25 MG Oral Tablet 24 Hr 90 tablet 1     Sig: Take 1 tablet (25 mg total) by mouth daily.        Hypertensive Medications Protocol Passed - 9/21/2023  9:21 PM        Passed - In person appointment in the past 12 or next 3 months     Recent Outpatient Visits              2 weeks ago Primary insomnia    [address], Agnieszka Garcia MD    Office Visit    1 month ago Encounter for annual health examination    Santino Peterson Wilkie Dill, MD    Office Visit    1 year ago Essential hypertension    Jessica Santino Mcwilliams Wilkie Dill, MD    Office Visit    1 year ago Pancreas cyst    Zoe Peterson Irvin Christ, MD    Office Visit    1 year ago Exposure to COVID-19 virus    [address] [facility])    Nurse Only          Future Appointments         Provider Department Appt Notes    [facility] [address], [place] Just follow up                    Passed - Last BP reading less than 140/90     BP Readings from Last 1 Encounters:  09/08/23 : 119/52              Passed - CMP or BMP in past 6 months     Recent Results (from the past 4392 hour(s))   Comp Metabolic Panel (14)    Collection Time: 08/08/23 11:31 AM   Result Value Ref Range    Glucose 113 (H) 70 - 99 mg/dL    Sodium 141 136 - 145 mmol/L    Potassium 4.1 3.5 - 5.1 mmol/L    Chloride 107 98 - 112 mmol/L    CO2 27.0 21.0 - 32.0 mmol/L    Anion Gap 7 0 - 18 mmol/L    BUN 22 (H) 7 - 18 mg/dL    Creatinine 1.07 0.70 - 1.30 mg/dL    BUN/CREA Ratio 20.6 (H) 10.0 - 20.0    Calcium, Total 8.9 8.5 - 10.1 mg/dL    Calculated Osmolality 296 (H) 275 - 295 mOsm/kg    eGFR-Cr 70 >=60 mL/min/1.73m2    ALT 21 16 - 61 U/L    AST 20 15 - 37 U/L    Alkaline Phosphatase 89 45 - 117 U/L    Bilirubin, Total 1.0 0.1 - 2.0 mg/dL    Total Protein 7.4 6.4 - 8.2 g/dL    Albumin 3.7 3.4 - 5.0 g/dL    Globulin  3.7 2.8 - 4.4 g/dL    A/G Ratio 1.0 1.0 - 2.0    Patient Fasting for CMP? Yes      *Note: Due to a large number of results and/or encounters for the requested time period, some results have not been displayed. A complete set of results can be found in Results Review.                Passed - In person appointment or virtual visit in the past 6 months     Recent Outpatient Visits              2 weeks ago Primary insomnia    Dave Loving MD    Office Visit    1 month ago Encounter for annual health examination    Nicole Gallegos MD    Office Visit    1 year ago Essential hypertension    Nicole Dobson MD    Office Visit    1 year ago Pancreas cyst    Judd Gallegos MD    Office Visit    1 year ago Exposure to COVID-19 virus    [address] [place] [address])    Nurse Only          Future Appointments         Provider Department Appt Notes    In 1 week Annie Porter MD [facility], [address] Just follow up                    [facility] or GFRNAA > 50     GFR Evaluation  EGFRCR: 70 , resulted on 8/8/2023             Recent Outpatient Visits              2 weeks ago Primary insomnia    Dave Loving MD    Office Visit    1 month ago Encounter for annual health examination    Nicole Gallegos MD    Office Visit    1 year ago Essential hypertension    Nicole Dobson MD    Office Visit    1 year ago Pancreas cyst    Erick Skelton MD    Office Visit    1 year ago Exposure to COVID-19 virus    [address] [place] [address])    Nurse Only          Future Appointments         Provider Department Appt Notes    In 1 week Annie Porter MD [employer], [address] Just follow up

## 2023-09-22 NOTE — TELEPHONE ENCOUNTER
Refill passed per [employer] protocol. Requested Prescriptions   Pending Prescriptions Disp Refills    Losartan Potassium-HCTZ 100-12.5 MG Oral Tab 90 tablet 1     Sig: Take 1 tablet by mouth daily.        Hypertensive Medications Protocol Passed - 9/22/2023 11:20 AM        Passed - In person appointment in the past 12 or next 3 months     Recent Outpatient Visits              2 weeks ago Primary insomnia    [address], [address] , Ryan Wooten MD    Office Visit    1 month ago Encounter for annual health examination    Santino Duran, Jg De Oliveira MD    Office Visit    1 year ago Essential hypertension    [address] [facility], Marleen Kuhn MD    Office Visit    1 year ago Pancreas cyst    Zoe Duran, Mario Young MD    Office Visit    1 year ago Exposure to COVID-19 virus    [address] ([facility])    Nurse Only          Future Appointments         Provider Department Appt Notes    In 1 week Isabelle Lomas MD [facility], [address], [place] Just follow up                    Passed - Last BP reading less than 140/90     BP Readings from Last 1 Encounters:  09/08/23 : 119/52              Passed - CMP or BMP in past 6 months     Recent Results (from the past 4392 hour(s))   Comp Metabolic Panel (14)    Collection Time: 08/08/23 11:31 AM   Result Value Ref Range    Glucose 113 (H) 70 - 99 mg/dL    Sodium 141 136 - 145 mmol/L    Potassium 4.1 3.5 - 5.1 mmol/L    Chloride 107 98 - 112 mmol/L    CO2 27.0 21.0 - 32.0 mmol/L    Anion Gap 7 0 - 18 mmol/L    BUN 22 (H) 7 - 18 mg/dL    Creatinine 1.07 0.70 - 1.30 mg/dL    BUN/CREA Ratio 20.6 (H) 10.0 - 20.0    Calcium, Total 8.9 8.5 - 10.1 mg/dL    Calculated Osmolality 296 (H) 275 - 295 mOsm/kg    eGFR-Cr 70 >=60 mL/min/1.73m2    ALT 21 16 - 61 U/L AST 20 15 - 37 U/L    Alkaline Phosphatase 89 45 - 117 U/L    Bilirubin, Total 1.0 0.1 - 2.0 mg/dL    Total Protein 7.4 6.4 - 8.2 g/dL    Albumin 3.7 3.4 - 5.0 g/dL    Globulin  3.7 2.8 - 4.4 g/dL    A/G Ratio 1.0 1.0 - 2.0    Patient Fasting for CMP? Yes      *Note: Due to a large number of results and/or encounters for the requested time period, some results have not been displayed. A complete set of results can be found in Results Review.                Passed - In person appointment or virtual visit in the past 6 months     Recent Outpatient Visits              2 weeks ago Primary insomnia    Bonnie Lutz MD    Office Visit    1 month ago Encounter for annual health examination    Nicole Ceballos MD    Office Visit    1 year ago Essential hypertension    Nicole Benjamin MD    Office Visit    1 year ago Pancreas cyst    Rama Ceballos MD    Office Visit    1 year ago Exposure to COVID-19 virus    [facility])    Nurse Only          Future Appointments         Provider Department Appt Notes    In 1 week MD Dominguez Gale, [address] Just follow up                    Passed - [facility] or GFRNAA > 50     GFR Evaluation  EGFRCR: 70 , resulted on 8/8/2023             Future Appointments         Provider Department Appt Notes    In 1 week MD Dominguez Gale, [address] Just follow up          Recent Outpatient Visits              2 weeks ago Primary insomnia    Dominguez Elkins, Mary Betts MD    Office Visit    1 month ago Encounter for annual health examination    Dominguez Elkins, [address] [place] [address] Tonja Og MD    Office Visit    1 year ago Essential hypertension    Santino Taylor, Luisa Davey MD    Office Visit    1 year ago Pancreas cyst    Fiorella Delgado MD    Office Visit    1 year ago Exposure to COVID-19 virus    [address] [facility])    Nurse Only

## 2023-09-22 NOTE — TELEPHONE ENCOUNTER
From: Abel Trevino  To: Sterling Dimas  Sent: 9/21/2023 9:30 PM CDT  Subject: Refill    Hello:    While submitting Refill request I by mistake requested for FLUOCINOLONE ACETONIDE 0.01% Soln which may be treated as cancelled. Thanks.     George Forrest

## 2023-10-04 ENCOUNTER — OFFICE VISIT (OUTPATIENT)
Facility: CLINIC | Age: 83
End: 2023-10-04

## 2023-10-04 ENCOUNTER — LAB ENCOUNTER (OUTPATIENT)
Dept: LAB | Facility: HOSPITAL | Age: 83
End: 2023-10-04
Attending: INTERNAL MEDICINE
Payer: MEDICARE

## 2023-10-04 ENCOUNTER — TELEPHONE (OUTPATIENT)
Facility: CLINIC | Age: 83
End: 2023-10-04

## 2023-10-04 VITALS
HEART RATE: 76 BPM | SYSTOLIC BLOOD PRESSURE: 118 MMHG | HEIGHT: 69 IN | WEIGHT: 125 LBS | BODY MASS INDEX: 18.51 KG/M2 | DIASTOLIC BLOOD PRESSURE: 73 MMHG

## 2023-10-04 DIAGNOSIS — K51.20 CHRONIC ULCERATIVE PROCTITIS WITHOUT COMPLICATIONS (HCC): ICD-10-CM

## 2023-10-04 DIAGNOSIS — D72.829 LEUKOCYTOSIS, UNSPECIFIED TYPE: ICD-10-CM

## 2023-10-04 DIAGNOSIS — K86.2 PANCREATIC CYST: Primary | ICD-10-CM

## 2023-10-04 LAB
BASOPHILS # BLD AUTO: 0.08 X10(3) UL (ref 0–0.2)
BASOPHILS NFR BLD AUTO: 0.5 %
DEPRECATED RDW RBC AUTO: 43.2 FL (ref 35.1–46.3)
EOSINOPHIL # BLD AUTO: 0.17 X10(3) UL (ref 0–0.7)
EOSINOPHIL NFR BLD AUTO: 1.1 %
ERYTHROCYTE [DISTWIDTH] IN BLOOD BY AUTOMATED COUNT: 13.5 % (ref 11–15)
HCT VFR BLD AUTO: 34.9 %
HGB BLD-MCNC: 10.8 G/DL
IMM GRANULOCYTES # BLD AUTO: 0.08 X10(3) UL (ref 0–1)
IMM GRANULOCYTES NFR BLD: 0.5 %
LYMPHOCYTES # BLD AUTO: 1.92 X10(3) UL (ref 1–4)
LYMPHOCYTES NFR BLD AUTO: 12.4 %
MCH RBC QN AUTO: 27 PG (ref 26–34)
MCHC RBC AUTO-ENTMCNC: 30.9 G/DL (ref 31–37)
MCV RBC AUTO: 87.3 FL
MONOCYTES # BLD AUTO: 1.53 X10(3) UL (ref 0.1–1)
MONOCYTES NFR BLD AUTO: 9.9 %
NEUTROPHILS # BLD AUTO: 11.67 X10 (3) UL (ref 1.5–7.7)
NEUTROPHILS # BLD AUTO: 11.67 X10(3) UL (ref 1.5–7.7)
NEUTROPHILS NFR BLD AUTO: 75.6 %
PLATELET # BLD AUTO: 319 10(3)UL (ref 150–450)
RBC # BLD AUTO: 4 X10(6)UL
WBC # BLD AUTO: 15.5 X10(3) UL (ref 4–11)

## 2023-10-04 PROCEDURE — 3074F SYST BP LT 130 MM HG: CPT | Performed by: INTERNAL MEDICINE

## 2023-10-04 PROCEDURE — 1159F MED LIST DOCD IN RCRD: CPT | Performed by: INTERNAL MEDICINE

## 2023-10-04 PROCEDURE — 3078F DIAST BP <80 MM HG: CPT | Performed by: INTERNAL MEDICINE

## 2023-10-04 PROCEDURE — 1126F AMNT PAIN NOTED NONE PRSNT: CPT | Performed by: INTERNAL MEDICINE

## 2023-10-04 PROCEDURE — 36415 COLL VENOUS BLD VENIPUNCTURE: CPT

## 2023-10-04 PROCEDURE — 3008F BODY MASS INDEX DOCD: CPT | Performed by: INTERNAL MEDICINE

## 2023-10-04 PROCEDURE — 99214 OFFICE O/P EST MOD 30 MIN: CPT | Performed by: INTERNAL MEDICINE

## 2023-10-04 PROCEDURE — 1160F RVW MEDS BY RX/DR IN RCRD: CPT | Performed by: INTERNAL MEDICINE

## 2023-10-04 PROCEDURE — 85025 COMPLETE CBC W/AUTO DIFF WBC: CPT

## 2023-10-04 RX ORDER — MESALAMINE 375 MG/1
1.5 CAPSULE, EXTENDED RELEASE ORAL DAILY
Qty: 120 CAPSULE | Refills: 11 | Status: SHIPPED | OUTPATIENT
Start: 2023-10-04

## 2023-10-04 NOTE — TELEPHONE ENCOUNTER
Scheduled for:  Colonoscopy 10890 / 34276    Provider Name:  Dr. 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 [facility] or [facility] Th  notified?: I sent an electronic request to Endo Scheduling and received a confirmation today. Medication Orders:  Patient is aware to NOT take iron pills, herbal meds and diet supplements for 7 days before exam. Also to NOT take any form of alcohol, recreational drugs and any forms of ED meds 24 hours before exam.     Misc Orders:  N/A     Further instructions given by staff:  I Provided prep instructions to patient and reviewed date, time and location. Patient verbalized that He understood and is aware to call with any questions. Patient was informed about the new cancellation policy for His procedure. Patient was also given copy of the cancellation policy at the time of the appointment and verbalized understanding.

## 2023-10-04 NOTE — PATIENT INSTRUCTIONS
Ulcerative proctitis  - continue on Apriso   - colonoscopy with Golytely and MAC   - lab work every 6 months    Pancreatic cyst  - MRCP ( without contrast)

## 2023-10-09 ENCOUNTER — IMMUNIZATION (OUTPATIENT)
Dept: LAB | Age: 83
End: 2023-10-09
Attending: EMERGENCY MEDICINE
Payer: MEDICARE

## 2023-10-09 DIAGNOSIS — Z23 NEED FOR VACCINATION: Primary | ICD-10-CM

## 2023-10-09 PROCEDURE — 90662 IIV NO PRSV INCREASED AG IM: CPT

## 2023-10-09 PROCEDURE — 90471 IMMUNIZATION ADMIN: CPT

## 2023-10-18 ENCOUNTER — IMMUNIZATION (OUTPATIENT)
Dept: LAB | Age: 83
End: 2023-10-18
Attending: EMERGENCY MEDICINE
Payer: MEDICARE

## 2023-10-18 DIAGNOSIS — Z23 NEED FOR VACCINATION: Primary | ICD-10-CM

## 2023-10-18 PROCEDURE — 90480 ADMN SARSCOV2 VAC 1/ONLY CMP: CPT

## 2023-11-03 ENCOUNTER — TELEPHONE (OUTPATIENT)
Facility: CLINIC | Age: 83
End: 2023-11-03

## 2023-11-03 NOTE — TELEPHONE ENCOUNTER
RN to see if we can move up the colonosocpy on this pt, he has progressive anemia  and is not scheduled until May

## 2023-11-04 PROBLEM — I10 PRIMARY HYPERTENSION: Status: ACTIVE | Noted: 2018-08-09

## 2023-11-04 PROBLEM — E78.5 HYPERLIPEMIA: Status: ACTIVE | Noted: 2018-08-09

## 2023-11-07 PROBLEM — R07.2 PRECORDIAL PAIN: Status: ACTIVE | Noted: 2022-11-28

## 2023-11-07 PROBLEM — A41.9 SEPSIS (HCC): Status: ACTIVE | Noted: 2019-05-17

## 2023-11-07 PROBLEM — R91.8 PULMONARY NODULES: Status: ACTIVE | Noted: 2019-05-17

## 2023-11-07 PROBLEM — K51.00 CHRONIC ULCERATIVE ENTEROCOLITIS WITHOUT COMPLICATION (HCC): Status: ACTIVE | Noted: 2023-03-15

## 2023-11-07 PROBLEM — J18.9 CAP (COMMUNITY ACQUIRED PNEUMONIA): Status: ACTIVE | Noted: 2019-05-17

## 2023-11-07 NOTE — TELEPHONE ENCOUNTER
He should get the anemia evaluated before he leaves. We can try to get his colonoscopy done asap but he may need to delay leaving.

## 2023-11-07 NOTE — TELEPHONE ENCOUNTER
Dr Rodger Aguayo,     I contacted patient and I read message from Dr Rodger Aguayo to about the progressive anemia, he informed me that he will be out of country until 5/01/2024, he will be leaving Saturday. There are no cancellations prior to patient's departure.

## 2023-11-08 NOTE — TELEPHONE ENCOUNTER
Kavita Esquivel         Per patient his leaving  out of the country this saturday and not returning until May we don't have anything available before Saturday please advise

## 2023-11-09 NOTE — TELEPHONE ENCOUNTER
The patient was contacted, he has colonoscopy scheduled for May 2024. He does not see any blood per rectum and we did discuss his anemia, he has had slightly decreasing blood counts over the last couple years. Also he has an elevated white count. He otherwise feels fine, he notes no blood in the stools or any other issues/ no diarrhea or active signs of colitis/procitis. He does take B12 injections but is not on iron. Patient does not wish to stay to get the colonoscopy, he would have to delay his trip. I be happy to talk to his son who is a physician in radiology.

## 2023-11-28 ENCOUNTER — HOSPITAL ENCOUNTER (OUTPATIENT)
Dept: MRI IMAGING | Facility: HOSPITAL | Age: 83
Discharge: HOME | End: 2023-11-28
Attending: INTERNAL MEDICINE
Payer: MEDICARE

## 2023-11-28 DIAGNOSIS — K86.2 PANCREATIC CYST: ICD-10-CM

## 2023-11-28 PROCEDURE — 74181 MRI ABDOMEN W/O CONTRAST: CPT | Performed by: INTERNAL MEDICINE

## 2023-11-30 ENCOUNTER — TELEPHONE (OUTPATIENT)
Facility: CLINIC | Age: 83
End: 2023-11-30

## 2023-11-30 NOTE — TELEPHONE ENCOUNTER
----- Message from Ruthie Flores MD sent at 11/28/2023  2:22 PM CST -----  MRI scan shows the pancreatic cysts to be stable. Repeat MRI in 2 years( nursing to place on call back list)    Kidney cysts-these are typically benign however would have you follow-up with your primary physician with any further questions on the kidney cysts.

## 2023-11-30 NOTE — TELEPHONE ENCOUNTER
2 year MRI recall entered into patient outreach in [address] [place] [address] Rd. Next MRI will be due 11/28/2025.   Snapshot updated  Patient viewed below result note in MyChart:  Seen by patient Elvie Luz on 11/28/2023  3:58 PM

## 2023-12-06 RX ORDER — TEMAZEPAM 15 MG/1
15 CAPSULE ORAL NIGHTLY PRN
Qty: 90 CAPSULE | Refills: 0 | Status: SHIPPED | OUTPATIENT
Start: 2023-12-06

## 2023-12-06 NOTE — TELEPHONE ENCOUNTER
Please review; protocol failed. Requested Prescriptions   Pending Prescriptions Disp Refills    temazepam 15 MG Oral Cap 90 capsule 0     Sig: Take 1 capsule (15 mg total) by mouth nightly as needed.  Can increase to two pills at a time if needed       There is no refill protocol information for this order        Recent Outpatient Visits              2 months ago Pancreatic cyst    Veronica Barajas, [address], Lord Kaykay MD    Office Visit    2 months ago Primary insomnia    Veronica Barajas, Caroline Villavicencio MD    Office Visit    4 months ago Encounter for annual health examination    Sheila Milian MD    Office Visit    1 year ago Essential hypertension    Sheila Aguilar MD    Office Visit    1 year ago Pancreas cyst    Lord Kaykay Milian MD    Office Visit          Future Appointments         Provider Department Appt Notes    In 4 months [address], [address], [place] Colonoscopy with MAC @ [address] [place] [address]

## 2023-12-07 RX ORDER — TEMAZEPAM 15 MG/1
CAPSULE ORAL
Qty: 90 CAPSULE | Refills: 0 | OUTPATIENT
Start: 2023-12-07

## 2024-03-20 RX ORDER — METOPROLOL SUCCINATE 25 MG/1
25 TABLET, EXTENDED RELEASE ORAL DAILY
Qty: 90 TABLET | Refills: 3 | OUTPATIENT
Start: 2024-03-20

## 2024-03-21 RX ORDER — ROSUVASTATIN CALCIUM 10 MG/1
10 TABLET, COATED ORAL DAILY
Qty: 90 TABLET | Refills: 3 | OUTPATIENT
Start: 2024-03-21

## 2024-03-22 NOTE — TELEPHONE ENCOUNTER
Refill passed per [facility] protocol.     Requested Prescriptions   Pending Prescriptions Disp Refills    rosuvastatin 10 MG Oral Tab 90 tablet 3     Sig: Take 1 tablet (10 mg total) by mouth daily.       Cholesterol Medication Protocol Passed - 3/20/2024  5:11 PM        Passed - ALT < 80     Lab Results   Component Value Date    ALT 21 08/08/2023             Passed - ALT resulted within past year        Passed - Lipid panel within past 12 months     Lab Results   Component Value Date    CHOLEST 92 08/08/2023    TRIG 83 08/08/2023    HDL 42 08/08/2023    LDL 33 08/08/2023    VLDL 11 08/08/2023    NONHDLC 50 08/08/2023             Passed - In person appointment or virtual visit in the past 12 mos or appointment in next 3 mos     Recent Outpatient Visits              5 months ago [facility]    Office Visit    6 months ago [facility] Ar[facility]    Office Visit    7 months ago [facility] for annual health examination    [facility]    Office Visit    1 year ago Essential hypertension    [facility] Kash Andujar MD    Office Visit    1 year ago Pancreas cyst (HCC)    [facility]    [facility]          Future Appointments         Provider Department Appt Notes    In 3 weeks Kash Andujar MD [facility] px, last px 08/07/23, policy informed    In 1 month ARACELI ISRAEL [facility]

## 2024-04-15 ENCOUNTER — LAB ENCOUNTER (OUTPATIENT)
Dept: LAB | Facility: HOSPITAL | Age: 84
End: 2024-04-15
Attending: INTERNAL MEDICINE
Payer: MEDICARE

## 2024-04-15 ENCOUNTER — OFFICE VISIT (OUTPATIENT)
Facility: CLINIC | Age: 84
End: 2024-04-15

## 2024-04-15 VITALS
WEIGHT: 125 LBS | HEART RATE: 79 BPM | SYSTOLIC BLOOD PRESSURE: 128 MMHG | BODY MASS INDEX: 18.51 KG/M2 | OXYGEN SATURATION: 97 % | HEIGHT: 69 IN | DIASTOLIC BLOOD PRESSURE: 62 MMHG

## 2024-04-15 DIAGNOSIS — D72.829 LEUKOCYTOSIS, UNSPECIFIED TYPE: ICD-10-CM

## 2024-04-15 DIAGNOSIS — R73.09 ELEVATED GLUCOSE: ICD-10-CM

## 2024-04-15 DIAGNOSIS — E55.9 VITAMIN D DEFICIENCY: ICD-10-CM

## 2024-04-15 DIAGNOSIS — I25.10 CORONARY ARTERY DISEASE INVOLVING NATIVE CORONARY ARTERY OF NATIVE HEART WITHOUT ANGINA PECTORIS: ICD-10-CM

## 2024-04-15 DIAGNOSIS — E53.8 B12 DEFICIENCY: ICD-10-CM

## 2024-04-15 DIAGNOSIS — E78.5 HYPERLIPIDEMIA, UNSPECIFIED HYPERLIPIDEMIA TYPE: ICD-10-CM

## 2024-04-15 DIAGNOSIS — Z00.00 ENCOUNTER FOR ANNUAL HEALTH EXAMINATION: Primary | ICD-10-CM

## 2024-04-15 DIAGNOSIS — I10 ESSENTIAL HYPERTENSION: ICD-10-CM

## 2024-04-15 LAB
BASOPHILS # BLD AUTO: 0.1 X10(3) UL (ref 0–0.2)
BASOPHILS NFR BLD AUTO: 0.7 %
DEPRECATED RDW RBC AUTO: 45.5 FL (ref 35.1–46.3)
EOSINOPHIL # BLD AUTO: 0.1 X10(3) UL (ref 0–0.7)
EOSINOPHIL NFR BLD AUTO: 0.7 %
ERYTHROCYTE [DISTWIDTH] IN BLOOD BY AUTOMATED COUNT: 14.9 % (ref 11–15)
EST. AVERAGE GLUCOSE BLD GHB EST-MCNC: 137 MG/DL (ref 68–126)
HBA1C MFR BLD: 6.4 % (ref ?–5.7)
HCT VFR BLD AUTO: 32.1 %
HGB BLD-MCNC: 10.4 G/DL
IMM GRANULOCYTES # BLD AUTO: 0.07 X10(3) UL (ref 0–1)
IMM GRANULOCYTES NFR BLD: 0.5 %
LYMPHOCYTES # BLD AUTO: 1.71 X10(3) UL (ref 1–4)
LYMPHOCYTES NFR BLD AUTO: 11.7 %
MCH RBC QN AUTO: 27.2 PG (ref 26–34)
MCHC RBC AUTO-ENTMCNC: 32.4 G/DL (ref 31–37)
MCV RBC AUTO: 84 FL
MONOCYTES # BLD AUTO: 1.36 X10(3) UL (ref 0.1–1)
MONOCYTES NFR BLD AUTO: 9.3 %
NEUTROPHILS # BLD AUTO: 11.27 X10 (3) UL (ref 1.5–7.7)
NEUTROPHILS # BLD AUTO: 11.27 X10(3) UL (ref 1.5–7.7)
NEUTROPHILS NFR BLD AUTO: 77.1 %
PLATELET # BLD AUTO: 338 10(3)UL (ref 150–450)
RBC # BLD AUTO: 3.82 X10(6)UL
WBC # BLD AUTO: 14.6 X10(3) UL (ref 4–11)

## 2024-04-15 PROCEDURE — 83036 HEMOGLOBIN GLYCOSYLATED A1C: CPT | Performed by: INTERNAL MEDICINE

## 2024-04-15 PROCEDURE — 1159F MED LIST DOCD IN RCRD: CPT | Performed by: INTERNAL MEDICINE

## 2024-04-15 PROCEDURE — 1126F AMNT PAIN NOTED NONE PRSNT: CPT | Performed by: INTERNAL MEDICINE

## 2024-04-15 PROCEDURE — 96160 PT-FOCUSED HLTH RISK ASSMT: CPT | Performed by: INTERNAL MEDICINE

## 2024-04-15 PROCEDURE — 36415 COLL VENOUS BLD VENIPUNCTURE: CPT | Performed by: INTERNAL MEDICINE

## 2024-04-15 PROCEDURE — G0439 PPPS, SUBSEQ VISIT: HCPCS | Performed by: INTERNAL MEDICINE

## 2024-04-15 PROCEDURE — 85025 COMPLETE CBC W/AUTO DIFF WBC: CPT | Performed by: INTERNAL MEDICINE

## 2024-04-15 PROCEDURE — 1160F RVW MEDS BY RX/DR IN RCRD: CPT | Performed by: INTERNAL MEDICINE

## 2024-04-15 PROCEDURE — 3008F BODY MASS INDEX DOCD: CPT | Performed by: INTERNAL MEDICINE

## 2024-04-15 PROCEDURE — 3078F DIAST BP <80 MM HG: CPT | Performed by: INTERNAL MEDICINE

## 2024-04-15 PROCEDURE — 1170F FXNL STATUS ASSESSED: CPT | Performed by: INTERNAL MEDICINE

## 2024-04-15 PROCEDURE — 3074F SYST BP LT 130 MM HG: CPT | Performed by: INTERNAL MEDICINE

## 2024-04-15 RX ORDER — CYANOCOBALAMIN 1000 UG/ML
1000 INJECTION, SOLUTION INTRAMUSCULAR; SUBCUTANEOUS
Qty: 1 ML | Refills: 12 | Status: SHIPPED | OUTPATIENT
Start: 2024-04-15

## 2024-04-15 RX ORDER — CLOTRIMAZOLE AND BETAMETHASONE DIPROPIONATE 10; .64 MG/G; MG/G
1 CREAM TOPICAL 2 TIMES DAILY PRN
Qty: 15 G | Refills: 2 | Status: SHIPPED | OUTPATIENT
Start: 2024-04-15

## 2024-04-15 RX ORDER — ERGOCALCIFEROL 1.25 MG/1
50000 CAPSULE ORAL WEEKLY
Qty: 12 CAPSULE | Refills: 1 | Status: SHIPPED | OUTPATIENT
Start: 2024-04-15

## 2024-04-15 NOTE — PATIENT INSTRUCTIONS
Prasad Dennis's SCREENING SCHEDULE   Tests on this list are recommended by your physician but may not be covered, or covered at this frequency, by your insurer.   Please check with your insurance carrier before scheduling to verify coverage.   PREVENTATIVE SERVICES FREQUENCY &  COVERAGE DETAILS LAST COMPLETION DATE   Diabetes Screening    Fasting Blood Sugar / Glucose    One screening every 12 months if never tested or if previously tested but not diagnosed with pre-diabetes   One screening every 6 months if diagnosed with pre-diabetes Lab Results   Component Value Date     (H) 08/08/2023        Cardiovascular Disease Screening    Lipid Panel  Cholesterol  Lipoprotein (HDL)  Triglycerides Covered every 5 years for all Medicare beneficiaries without apparent signs or symptoms of cardiovascular disease Lab Results   Component Value Date    CHOLEST 92 08/08/2023    HDL 42 08/08/2023    LDL 33 08/08/2023    TRIG 83 08/08/2023         Electrocardiogram (EKG)   Covered if needed at Welcome to Medicare, and non-screening if indicated for medical reasons 03/15/2019      Ultrasound Screening for Abdominal Aortic Aneurysm (AAA) Covered once in a lifetime for one of the following risk factors   • Men who are 65-75 years old and have ever smoked   • Anyone with a family history -     Colorectal Cancer Screening  Covered for ages 50-85; only need ONE of the following:    Colonoscopy   Covered every 10 years    Covered every 2 years if patient is at high risk or previous colonoscopy was abnormal 09/04/2019    Health Maintenance   Topic Date Due   • Colorectal Cancer Screening  09/04/2021       Flexible Sigmoidoscopy   Covered every 4 years -    Fecal Occult Blood Test Covered annually -   Prostate Cancer Screening    Prostate-Specific Antigen (PSA) Annually Lab Results   Component Value Date    PSA 1.4 08/10/2018     There are no preventive care reminders to display for this patient.   Immunizations    Influenza Covered  once per flu season  Please get every year 10/09/2023  No recommendations at this time    Pneumococcal Each vaccine ([id number] & [id number]) covered once after 65 Prevnar 13: 06/13/2019    [id number]: 09/28/2020     No recommendations at this time    Hepatitis B One screening covered for patients with certain risk factors   -  No recommendations at this time    Tetanus Toxoid Not covered by Medicare Part B unless medically necessary (cut with metal); may be covered with your pharmacy prescription benefits -    Tetanus, Diptheria and Pertusis TD and TDaP Not covered by Medicare Part B -  No recommendations at this time    Zoster Not covered by Medicare Part B; may be covered with your pharmacy  prescription benefits -  Zoster Vaccines(1 of 2) Never done     Annual Monitoring of Persistent Medications (ACE/ARB, digoxin diuretics, anticonvulsants)    Potassium Annually Lab Results   Component Value Date    K 4.1 08/08/2023         Creatinine   Annually Lab Results   Component Value Date    CREATSERUM 1.07 08/08/2023         BUN Annually Lab Results   Component Value Date    BUN 22 (H) 08/08/2023       Drug Serum Conc Annually No results found for: \"DIGOXIN\", \"DIG\", \"VALP\"

## 2024-04-15 NOTE — PROGRESS NOTES
Subjective:   Prasad Dennis is a 83 year old male who presents for a MA (Medicare Advantage) Supervisit (Once per calendar year) and scheduled follow up of multiple significant but stable problems.     Patient is here for Medicare advantage AHA visit and follow-up on chronic medical issues as listed below.  Last seen here August of last year also for [facility] visit.  At that time discussed possibly increasing dose of temazepam for better control of insomnia.  Since that time he saw different internal medicine provider as well as gastroenterologist.  He had MRCP performed which showed stable lesions of the pancreas that appeared benign.  He had blood work done back in August that was remarkable for a blood sugar of 113.  Also white count of 15.4 and hemoglobin of 11.9.  Repeat study done on October 4 showed white count of 15.5 and hemoglobin of 10.8.  Current medications reviewed.  Health maintenance and vaccination status reviewed.  Advanced directives reviewed.    There was confusion about the temezepam prescription. He takes it every night. No abdominal pain or loss of weight. He just came out of [place]. He needs a B12 prescription. He spends winter in [place]. Has a dry spot on his forehead. Also with a sore by the tailbone that comes and goes.  Diet is healthy. For exercise, he does stairs in the house. No regular exercise regimen. No tobacco or EtOH. He will have colonoscopy in May.      Wt Readings from Last 6 Encounters:   04/15/24 125 lb (56.7 kg)   10/04/23 125 lb (56.7 kg)   09/08/23 125 lb (56.7 kg)   08/07/23 125 lb (56.7 kg)   04/09/22 124 lb (56.2 kg)   03/24/22 126 lb (57.2 kg)           History/Other:   Fall Risk Assessment:   He has been screened for Falls and is High Risk. Fall Prevention information provided to patient in After Visit Summary.    Do you feel unsteady when standing or walking?: No  Do you worry about falling?: Yes  Have you fallen in the past year?: No     Cognitive Assessment:   He had  a completely normal cognitive assessment - see flowsheet entries     Functional Ability/Status:   Prasad Dennis has some abnormal functions as listed below:  He has Vision problems based on screening of functional status.       Depression Screening (PHQ-2/PHQ-9): PHQ-2 SCORE: 0  , done 4/13/2024             Advanced Directives:   He does have a Living Will but we do NOT have it on file in Epic.    He does NOT have a Power of  for Health Care. [Do you have a healthcare power of ?: No]  Discussed Advance Care Planning with patient (and family/surrogate if present). Standard forms made available to patient in After Visit Summary.      Patient Active Problem List   Diagnosis    Primary hypertension    Hyperlipemia    Ulcerative colitis without complications (HCC)    Coronary artery disease involving native heart without angina pectoris    Palpitation    Pancreatic cyst (HCC)    Sepsis (HCC)    Pulmonary nodules    Precordial pain    Chronic ulcerative enterocolitis without complication (HCC)    CAP (community acquired pneumonia)     Allergies:  He is allergic to codeine.    Current Medications:  Outpatient Medications Marked as Taking for the 4/15/24 encounter (Office Visit) with Kash Andujar MD   Medication Sig    temazepam 15 MG Oral Cap Take 1 capsule (15 mg total) by mouth nightly as needed. Can increase to two pills at a time if needed    rosuvastatin 10 MG Oral Tab Take 1 tablet (10 mg total) by mouth daily.    metoprolol succinate ER 25 MG Oral Tablet 24 Hr Take 1 tablet (25 mg total) by mouth daily.    Losartan Potassium-HCTZ 100-12.5 MG Oral Tab Take 1 tablet by mouth daily.    aspirin 81 MG Oral Chew Tab Chew 1 tablet (81 mg total) by mouth daily.    cyanocobalamin 1000 MCG/ML Injection Solution Inject 1 mL (1,000 mcg total) into the muscle every 30 (thirty) days.    ergocalciferol 1.25 MG (38508 UT) Oral Cap Take 1 capsule (50,000 Units total) by mouth once a week.       Medical  History:  He  has a past medical history of CAD (coronary artery disease) (2009), High blood pressure, High cholesterol, and Pneumonia due to organism.  Surgical History:  He  has a past surgical history that includes colonoscopy; cath drug eluting stent; hernia surgery; cataract; cholecystectomy; and colonoscopy (N/A, 9/4/2019).   Family History:  His family history is not on file.  Social History:  He  reports that he has never smoked. He has never used smokeless tobacco. He reports that he does not drink alcohol and does not use drugs.    Tobacco:  He has never smoked tobacco.    CAGE Alcohol Screen:   CAGE screening score of 0 on 4/13/2024, showing low risk of alcohol abuse.      Patient Care Team:  Kash Andujar MD as PCP - General (Internal Medicine)    Review of Systems       Objective:   Physical Exam  Constitutional:       Appearance: Normal appearance. He is well-developed.   HENT:      Right Ear: Tympanic membrane and ear canal normal.      Left Ear: Tympanic membrane and ear canal normal.      Nose: Nose normal.      Mouth/Throat:      Pharynx: No oropharyngeal exudate or posterior oropharyngeal erythema.   Eyes:      Conjunctiva/sclera: Conjunctivae normal.      Pupils: Pupils are equal, round, and reactive to light.   Neck:      Thyroid: No thyromegaly.      Vascular: No carotid bruit.   Cardiovascular:      Rate and Rhythm: Normal rate and regular rhythm.      Pulses: Normal pulses.      Heart sounds: Normal heart sounds. No murmur heard.  Pulmonary:      Effort: Pulmonary effort is normal.      Breath sounds: Normal breath sounds. No wheezing or rales.   Abdominal:      General: Bowel sounds are normal.      Palpations: Abdomen is soft. There is no mass.      Tenderness: There is no abdominal tenderness.   Genitourinary:     Penis: Normal.       Testes: Normal.   Musculoskeletal:      Right lower leg: No edema.      Left lower leg: No edema.   Lymphadenopathy:      Cervical: No cervical  adenopathy.   Skin:     General: Skin is warm and dry.      Findings: No rash.   Neurological:      General: No focal deficit present.      Mental Status: He is alert.      Cranial Nerves: No cranial nerve deficit.      Coordination: Coordination normal.   Psychiatric:         Mood and Affect: Mood normal.         Behavior: Behavior normal.         Thought Content: Thought content normal.         Judgment: Judgment normal.          /62   Pulse 79   Ht 5' 9\" (1.753 m)   Wt 125 lb (56.7 kg)   SpO2 97%   BMI 18.46 kg/m²  Estimated body mass index is 18.46 kg/m² as calculated from the following:    Height as of this encounter: 5' 9\" (1.753 m).    Weight as of this encounter: 125 lb (56.7 kg).    Medicare Hearing Assessment:   Hearing Screening    Screening Method: Finger Rub  Finger Rub Result: Fail               Assessment & Plan:   Prasad Dennis is a 83 year old male who presents for a Medicare Assessment.     1. Encounter for annual health examination  Physical.  Active issues as below.  Health maintenance issues reviewed.  Recent blood test reviewed.  Encouraged healthy diet and regular exercise.  Advanced directives reviewed.    2. Essential hypertension  Well-controlled.  Continue current treatment.  Work on diet and exercise.    3. Coronary artery disease involving native coronary artery of native heart without angina pectoris  Asymptomatic.  Continue current medications.  Follow-up with cardiology.    4. B12 deficiency  Recent B12 level look good.  Continue with supplementation.    5. Leukocytosis, unspecified type  Persistent anemia and elevated white blood cell count.  Check CBC again.  Consider hematology evaluation if persistent.  - CBC With Differential With Platelet    6. Hyperlipidemia, unspecified hyperlipidemia type  Recent lipid profile looked good.  Continue with current dose of rosuvastatin.  Work on diet and exercise.    7. Vitamin D deficiency  Stable.  Continue current  supplementation.    8. Elevated glucose  Check blood sugar and A1c.  No prior history of diabetes.  - Hemoglobin A1C  The patient indicates understanding of these issues and agrees to the plan.  Reinforced healthy diet, lifestyle, and exercise.      No follow-ups on file.     Kash Andujar MD, 4/15/2024     Supplementary Documentation:   General Health:  In the past six months, have you lost more than 10 pounds without trying?: 2 - No  Has your appetite been poor?: No  Type of Diet: Balanced  How does the patient maintain a good energy level?: Other  How would you describe your daily physical activity?: Light  How would you describe your current health state?: Fair  How do you maintain positive mental well-being?: Social Interaction  On a scale of 0 to 10, with 0 being no pain and 10 being severe pain, what is your pain level?: 0 - (None)  In the past six months, have you experienced urine leakage?: 0-No  At any time do you feel concerned for the safety/well-being of yourself and/or your children, in your home or elsewhere?: Yes  Have you had any immunizations at another office such as Influenza, Hepatitis B, Tetanus, or Pneumococcal?: No        Prasad Dennis's SCREENING SCHEDULE   Tests on this list are recommended by your physician but may not be covered, or covered at this frequency, by your insurer.   Please check with your insurance carrier before scheduling to verify coverage.   PREVENTATIVE SERVICES FREQUENCY &  COVERAGE DETAILS LAST COMPLETION DATE   Diabetes Screening    Fasting Blood Sugar / Glucose    One screening every 12 months if never tested or if previously tested but not diagnosed with pre-diabetes   One screening every 6 months if diagnosed with pre-diabetes Lab Results   Component Value Date     (H) 08/08/2023        Cardiovascular Disease Screening    Lipid Panel  Cholesterol  Lipoprotein (HDL)  Triglycerides Covered every 5 years for all Medicare beneficiaries without apparent signs  or symptoms of cardiovascular disease Lab Results   Component Value Date    CHOLEST 92 08/08/2023    HDL 42 08/08/2023    LDL 33 08/08/2023    TRIG 83 08/08/2023         Electrocardiogram (EKG)   Covered if needed at Welcome to Medicare, and non-screening if indicated for medical reasons 03/15/2019      Ultrasound Screening for Abdominal Aortic Aneurysm (AAA) Covered once in a lifetime for one of the following risk factors    Men who are 65-75 years old and have ever smoked    Anyone with a family history -     Colorectal Cancer Screening  Covered for ages 50-85; only need ONE of the following:    Colonoscopy   Covered every 10 years    Covered every 2 years if patient is at high risk or previous colonoscopy was abnormal 09/04/2019    Health Maintenance   Topic Date Due    Colorectal Cancer Screening  09/04/2021       Flexible Sigmoidoscopy   Covered every 4 years -    Fecal Occult Blood Test Covered annually -   Prostate Cancer Screening    Prostate-Specific Antigen (PSA) Annually Lab Results   Component Value Date    PSA 1.4 08/10/2018     There are no preventive care reminders to display for this patient.   Immunizations    Influenza Covered once per flu season  Please get every year 10/09/2023  No recommendations at this time    Pneumococcal Each vaccine ([id number] & [id number]) covered once after 65 Prevnar 13: 06/13/2019    [id number]: 09/28/2020     No recommendations at this time    Hepatitis B One screening covered for patients with certain risk factors   -  No recommendations at this time    Tetanus Toxoid Not covered by Medicare Part B unless medically necessary (cut with metal); may be covered with your pharmacy prescription benefits -    Tetanus, Diptheria and Pertusis TD and TDaP Not covered by Medicare Part B -  No recommendations at this time    Zoster Not covered by Medicare Part B; may be covered with your pharmacy  prescription benefits -  Zoster Vaccines(1 of 2) Never done     Annual Monitoring  of Persistent Medications (ACE/ARB, digoxin diuretics, anticonvulsants)    Potassium Annually Lab Results   Component Value Date    K 4.1 08/08/2023         Creatinine   Annually Lab Results   Component Value Date    CREATSERUM 1.07 08/08/2023         BUN Annually Lab Results   Component Value Date    BUN 22 (H) 08/08/2023       Drug Serum Conc Annually No results found for: \"DIGOXIN\", \"DIG\", \"VALP\"

## 2024-04-19 ENCOUNTER — TELEPHONE (OUTPATIENT)
Facility: CLINIC | Age: 84
End: 2024-04-19

## 2024-04-19 NOTE — TELEPHONE ENCOUNTER
Called patient - states he wants to cancel and does not want to reschedule at this time.    Please refer to [facility] dated 10/4/2023 for scheduling orders.    [facility] closed.

## 2024-05-02 RX ORDER — LOSARTAN POTASSIUM AND HYDROCHLOROTHIAZIDE 12.5; 1 MG/1; MG/1
1 TABLET ORAL DAILY
Qty: 90 TABLET | Refills: 3 | OUTPATIENT
Start: 2024-05-02

## 2024-05-08 ENCOUNTER — TELEPHONE (OUTPATIENT)
Dept: HEMATOLOGY/ONCOLOGY | Facility: HOSPITAL | Age: 84
End: 2024-05-08

## 2024-05-08 NOTE — TELEPHONE ENCOUNTER
Pt is calling to make a consult appt-NL    New Consult:Dr. Weir  Referred by:Dr. Andujar  Reason:Leukocytosis, unspecified type, Anemia, unspecified type   Insurance:[facility] medicare

## 2024-05-10 ENCOUNTER — NURSE ONLY (OUTPATIENT)
Dept: HEMATOLOGY/ONCOLOGY | Facility: HOSPITAL | Age: 84
End: 2024-05-10
Attending: INTERNAL MEDICINE
Payer: MEDICARE

## 2024-05-10 ENCOUNTER — TELEPHONE (OUTPATIENT)
Dept: HEMATOLOGY/ONCOLOGY | Facility: HOSPITAL | Age: 84
End: 2024-05-10

## 2024-05-10 VITALS
RESPIRATION RATE: 16 BRPM | TEMPERATURE: 98 F | OXYGEN SATURATION: 99 % | HEIGHT: 68.5 IN | DIASTOLIC BLOOD PRESSURE: 71 MMHG | HEART RATE: 71 BPM | WEIGHT: 122 LBS | BODY MASS INDEX: 18.28 KG/M2 | SYSTOLIC BLOOD PRESSURE: 139 MMHG

## 2024-05-10 DIAGNOSIS — D64.9 ANEMIA, UNSPECIFIED TYPE: ICD-10-CM

## 2024-05-10 DIAGNOSIS — E61.1 IRON DEFICIENCY: ICD-10-CM

## 2024-05-10 DIAGNOSIS — D72.829 LEUKOCYTOSIS, UNSPECIFIED TYPE: Primary | ICD-10-CM

## 2024-05-10 PROBLEM — D50.9 IRON DEFICIENCY ANEMIA: Status: ACTIVE | Noted: 2024-05-10

## 2024-05-10 LAB
BASOPHILS # BLD AUTO: 0.08 X10(3) UL (ref 0–0.2)
BASOPHILS NFR BLD AUTO: 0.8 %
DEPRECATED HBV CORE AB SER IA-ACNC: 12.4 NG/ML
DEPRECATED RDW RBC AUTO: 44.4 FL (ref 35.1–46.3)
EOSINOPHIL # BLD AUTO: 0.08 X10(3) UL (ref 0–0.7)
EOSINOPHIL NFR BLD AUTO: 0.8 %
ERYTHROCYTE [DISTWIDTH] IN BLOOD BY AUTOMATED COUNT: 14.3 % (ref 11–15)
HAPTOGLOB SERPL-MCNC: 242 MG/DL (ref 40–280)
HCT VFR BLD AUTO: 34.3 %
HGB BLD-MCNC: 10.8 G/DL
IMM GRANULOCYTES # BLD AUTO: 0.05 X10(3) UL (ref 0–1)
IMM GRANULOCYTES NFR BLD: 0.5 %
IRON SATN MFR SERPL: 7 %
IRON SERPL-MCNC: 25 UG/DL
LDH SERPL L TO P-CCNC: 117 U/L
LYMPHOCYTES # BLD AUTO: 1.25 X10(3) UL (ref 1–4)
LYMPHOCYTES NFR BLD AUTO: 12.6 %
MCH RBC QN AUTO: 26.7 PG (ref 26–34)
MCHC RBC AUTO-ENTMCNC: 31.5 G/DL (ref 31–37)
MCV RBC AUTO: 84.9 FL
MONOCYTES # BLD AUTO: 1.02 X10(3) UL (ref 0.1–1)
MONOCYTES NFR BLD AUTO: 10.2 %
NEUTROPHILS # BLD AUTO: 7.48 X10 (3) UL (ref 1.5–7.7)
NEUTROPHILS # BLD AUTO: 7.48 X10(3) UL (ref 1.5–7.7)
NEUTROPHILS NFR BLD AUTO: 75.1 %
PLATELET # BLD AUTO: 334 10(3)UL (ref 150–450)
RBC # BLD AUTO: 4.04 X10(6)UL
TIBC SERPL-MCNC: 358 UG/DL (ref 250–425)
TRANSFERRIN SERPL-MCNC: 240 MG/DL (ref 215–365)
WBC # BLD AUTO: 10 X10(3) UL (ref 4–11)

## 2024-05-10 PROCEDURE — 36415 COLL VENOUS BLD VENIPUNCTURE: CPT

## 2024-05-10 PROCEDURE — 99204 OFFICE O/P NEW MOD 45 MIN: CPT | Performed by: INTERNAL MEDICINE

## 2024-05-10 NOTE — PROGRESS NOTES
Hematology consult    5/10/2024    Leukocytosis anemia    Requesting: Dr. Andujar      HPI:  83 year old  Evaluated by hematology for leukocytosis and anemia    The patient feels well denies any fevers chills night sweats unintentional weight loss denies any adenopathy      Recent Labs   Lab 05/10/24  0940   RBC 4.04   HGB 10.8*   HCT 34.3*   MCV 84.9   MCH 26.7   MCHC 31.5   RDW 14.3   NEPRELIM 7.48   WBC 10.0   .0           Past Medical History:    CAD (coronary artery disease)    Stent placement    High blood pressure    High cholesterol    Pneumonia due to organism     Social History     Socioeconomic History    Marital status:    Tobacco Use    Smoking status: Never    Smokeless tobacco: Never   Vaping Use    Vaping status: Never Used   Substance and Sexual Activity    Alcohol use: No    Drug use: No    Sexual activity: Never     Partners: Female     No family history on file.  Current Outpatient Medications on File Prior to Visit   Medication Sig Dispense Refill    ergocalciferol 1.25 MG (06670 UT) Oral Cap Take 1 capsule (50,000 Units total) by mouth once a week. 12 capsule 1    cyanocobalamin 1000 MCG/ML Injection Solution Inject 1 mL (1,000 mcg total) into the muscle every 30 (thirty) days. 1 mL 12    clotrimazole-betamethasone 1-0.05 % External Cream Apply 1 Application topically 2 (two) times daily as needed. 15 g 2    temazepam 15 MG Oral Cap Take 1 capsule (15 mg total) by mouth nightly as needed. Can increase to two pills at a time if needed 90 capsule 0    APRISO 0.375 g Oral Capsule SR 24 Hr Take 4 capsules (1.5 g total) by mouth daily. 120 capsule 11    rosuvastatin 10 MG Oral Tab Take 1 tablet (10 mg total) by mouth daily. 90 tablet 3    metoprolol succinate ER 25 MG Oral Tablet 24 Hr Take 1 tablet (25 mg total) by mouth daily. 90 tablet 3    Losartan Potassium-HCTZ 100-12.5 MG Oral Tab Take 1 tablet by mouth daily. 90 tablet 3    aspirin 81 MG Oral Chew Tab Chew 1 tablet (81 mg  total) by mouth daily.       No current facility-administered medications on file prior to visit.     Vitals:    05/10/24 0910   BP: 139/71   Pulse: 71   Resp: 16   Temp: 97.6 °F (36.4 °C)     No acute distress alert and oriented regular rate nondistended normal mood no lymphadenopathy no deformity      A/P:  83 year old with anemia leukocytosis.     -- leukocytosis resolved on today's labs.  His anemia is from iron deficiency given severity we will set up for IV iron we will discuss endoscopies if not done recently (labs returned after visit completed)

## 2024-05-10 NOTE — TELEPHONE ENCOUNTER
Called Prasad regarding his lab results. Informed him that Dr. Weir is recommending Iron Dextran infusion. Discussed infusion and possible side effects. Told him that the infusion  will be reaching out to schedule infusion. We also discussed the need for a GI workup, Colonoscopy to find the source of his low Iron counts. He voiced understanding and stated he will call his GI doctor.

## 2024-05-13 DIAGNOSIS — D64.9 ANEMIA, UNSPECIFIED TYPE: ICD-10-CM

## 2024-05-13 DIAGNOSIS — D72.829 LEUKOCYTOSIS, UNSPECIFIED TYPE: Primary | ICD-10-CM

## 2024-05-16 ENCOUNTER — PATIENT MESSAGE (OUTPATIENT)
Facility: CLINIC | Age: 84
End: 2024-05-16

## 2024-05-16 DIAGNOSIS — D64.9 ANEMIA, UNSPECIFIED TYPE: ICD-10-CM

## 2024-05-16 DIAGNOSIS — D72.829 LEUKOCYTOSIS, UNSPECIFIED TYPE: Primary | ICD-10-CM

## 2024-05-17 NOTE — TELEPHONE ENCOUNTER
"Initial /68 (BP Location: Right arm, Patient Position: Chair, Cuff Size: Adult Regular)   Pulse 79   Resp 16   Ht 1.651 m (5' 5\")   Wt 86.5 kg (190 lb 9.6 oz)   LMP 02/24/2022   Breastfeeding No   BMI 31.72 kg/m   Estimated body mass index is 31.72 kg/m  as calculated from the following:    Height as of this encounter: 1.651 m (5' 5\").    Weight as of this encounter: 86.5 kg (190 lb 9.6 oz). .    Amy Almonte, JARED    " Patient updated.

## 2024-05-17 NOTE — TELEPHONE ENCOUNTER
Dr Andujar, please advise (out of office)  Sanjuanita Copeland-please advise-sent to [facility]    Patient did receive a letter from Dr Weir office with other staff suggestions.     From: Prasad Dennis  To: Kash Andujar  Sent: 5/16/2024  7:54 PM CDT  Subject: Dr Weir    I got a message that Dr Weir has left [place].  Who do you refer me to for my next appointment?    Prasad Dennis

## 2024-05-28 ENCOUNTER — TELEPHONE (OUTPATIENT)
Dept: HEMATOLOGY/ONCOLOGY | Facility: HOSPITAL | Age: 84
End: 2024-05-28

## 2024-05-28 DIAGNOSIS — D64.9 ANEMIA, UNSPECIFIED TYPE: ICD-10-CM

## 2024-05-28 DIAGNOSIS — D72.829 LEUKOCYTOSIS, UNSPECIFIED TYPE: Primary | ICD-10-CM

## 2024-05-28 NOTE — TELEPHONE ENCOUNTER
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 increased. He had difficulties with inhibition; switching; cognitive flexibility; self-monitoring (being aware of his performance and adapting accordingly); and consistently following the directions, especially after the rules changed. In particular, Stephen performed in the broadly average range on a verbal executive functioning task that required him to generate words based on various rules. However, he made  errors reflecting that he struggled to keep the rules/instructions in mind, as well as other errors reflecting that he lost track of what he had said, with his performance in the slightly below average and impaired range, respectively. On a different task, Stephen performed in the average range in terms of visual scanning and sequencing numbers. However, his performance was in the below average range when he was asked to sequence letters; the alphabet did not seem to come automatically and quickly for him. Stephen performed in the impaired range as the task became more difficult and he was asked to switch between sequencing different stimuli. Consistent with his performance, Stephen s parents reported difficulties with executive functioning in daily life. They shared that Stephen gets lost along the way when asked to complete multistep instructions. Stephen ruiz parents reported challenges with shifting, emotional control, emotion regulation, and initiation on a standardized questionnaire. His teacher rated challenges with emotional control on the questionnaire. Taken all together, findings indicate difficulties with several aspects of executive functioning, and importantly, significant struggles with attention which may not be obvious due to Stephen ruiz  polite, generally controlled behavior and fine intelligence. Many of these symptoms overlap with ADHD. While Stephen has symptoms of ADHD, he does not currently meet full criteria for this condition. We recommend continued monitoring for ADHD, especially given this disorder is more common among individuals with seizure disorder. Stephen will still benefit from supports designed for children with ADHD. Of note, individuals with a profile similar to Stephen may make inattentive errors, forget to check their work, and struggle managing their time, knowing where to start on a task, thinking of new ways to approach a problem, starting and completing difficult tasks independently, and knowing when they need help and what to ask for. In addition, these challenges can relate to inflexible thinking and difficulties monitoring behavior in social situations, making it difficult to understand others  viewpoints.     In terms of emotional functioning, Stephen s parents expressed concerns that he has had scary experiences waking from seizures in the hospital or seeing EMS. Stephen shared that medical appointments make him nervous, especially if he is going to get poked. There is an important growing literature on the stress experienced by the child and the family during involved medical journeys particularly when there is uncertainty and many appointments. This strain may be seen in emotional or behavioral concerns, or outburst behaviors. In addition, children with attentional and executive functioning challenges can have difficulties managing their emotions, particularly when frustrated, disappointed, or unsure of what s expected of them, and thus have behavioral  melt-downs.  In Stephen s case, his parents shared that he has outbursts a few times per month. His mother reported concerns with externalizing problems, aggression, and conduct problems on a standardized questionnaire. Stephen s teacher did not report these concerns. Many children  with attentional challenges work very hard during the school day and come home with their coping skills depleted, which may make them tend to have outbursts in their home setting where they feel most comfortable. Taken all together, Stephen will benefit from supports for his emotional, behavioral, and social functioning. In particular, he may benefit from therapy to process his medical experiences, when he is ready and as he potentially proceeds with neurosurgery.     As mentioned previously, Stephen has a history of speech delay and he continues to be involved in speech/language therapy to address articulation challenges. Such articulation difficulties were observed in testing. Stephen also ordered some of his words in atypical ways (e.g.,  You can both eat them,   You can both drink it ). In terms of verbal comprehension, Stephen exhibited variability in his performance across the tasks that make up this domain. He performed in the above average range on a task that required him define words (Vocabulary). He had more difficulty on a task that required abstract reasoning as he was asked to describe similarities among words (Similarities), with his performance in the slightly below average range. Stephen s mother rated his communication skills as low average on a standardized questionnaire. We recommend continued speech/language services in the school setting.     In terms of fine motor functioning, Stephen had difficulties with a task of speeded fine motor dexterity that required him to quickly place pegs in a board. He performed in the slightly below average range using his right dominant hand, impaired range using his left hand, and below average range using both hands together. Stephen also had difficulties with a task of visuomotor coordination that required him to copy increasingly complex designs, with his performance in the slightly below average range. Consistent with his performance, Stephen has a history of challenges  with poor handwriting. His parents denied concerns with fine motor functioning in the interview and his mother rated his motor abilities in daily life as average on a standardized questionnaire. Still, Stephen will benefit from supports in the school setting to address fine motor challenges (see recommendation section below). We also recommend that he continue to engage in occupational therapy to address fine motor functioning.     In summary, it was a delight working with Stephen and his family. He has many neurocognitive strengths and a family who clearly loves and advocates for him. We expect Stephen to continue making forward progression in his skills with supports, and we remain available for continued monitoring and adjustment of recommendations as he ages.     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 This therapy contains practical components to manage medically related symptoms (e.g., techniques  to address attention, executive functioning, and emotional and behavioral concerns( and may also involve evidenced-based practices to manage the traumatizing components, such as Trauma-Focused Cognitive Behavioral Therapy (TF-CBT). TF-CBT can be particularly helpful for children with similar medical experiences as Rekha ruiz. Therapy will include developing Stephen s emotion recognition and labeling, along with coping skills. Therapy will involve parent involvement given Stephen s age. We have put in a referral for the [facility]ology [facility] and you should hear from scheduling in the future. Their team can also be reached at [phone]. Waitlists can be long, so you are welcome to get on multiple waitlists.  o TF-CBT providers can be found at this site: [email or web address]   o Here are clinics that would be good options and some have therapists trained in trauma and/or TF-CBT:  - [facility] ([place]; this team also has providers that offer therapy, [phone])   - [facility] ([place] and [place], [phone], [email or web address]/)  - [facility], (multiple locations), [phone]; [email or web address]   - [facility] (multiple locations), [phone], [email or web address]   - [facility], [phone], [email or web address]   - [facility] ([place] and [place]), [phone], [email or web address]://www.[email or web address]/)    Occupational therapy: Stephen s parents did not feel that he shows motor challenges to interfere with daily functioning. Given some motor findings on testing, as well as the location of Stephen s lesion, it may be reasonable to pursue an outpatient occupational therapy evaluation to determine whether it makes sense to engage in any intervention.     Neuropsychological Re-evaluation: It is recommended that Stephen undergo neuropsychological re-evaluation 6 months after surgery if neurosurgery is pursued. If neurosurgery is not pursued, we recommend that he be re-evaluated in 1 year. We would be happy to see Stephen sooner should need arise. Our clinic can be contacted at [phone].       School   We are glad to hear that Stephen has an Individualized Education Plan (IEP). We recommend that this report be shared with his educators to relay findings about his neurocognitive strengths and weaknesses. When  providing this evaluation to the school, his caregivers should attach a signed and dated cover letter requesting that the recommendations from the evaluation be considered for implementation as part of an IEP.    We recommend that Stephen be considered for direct occupational therapy services due to fine motor challenges.      We recommend that he continue to engage in speech/language therapy.    We recommend continued assessment and intervention for academic functioning (reading, written expression, math).      Given concerns with anxiety, behavior and emotion regulation, and social functioning, Stephen may benefit from counseling and/or social work services in the school setting. Relatedly, he should be provided with a point-person (e.g., school counselor, , nurse, principal) with whom to meet.    Given social concerns, Stephen would benefit from social skills training and/or engagement in social skills groups (e.g., [employer] [place]).    The evaluation revealed several areas of challenge related to attention and executive functioning. We recommend that Stephen s teachers be made aware that his attention and executive problems may not be obvious but have measured as rather significant. Supports therefore are recommended even if he does not appear to be having difficulty.    Multi-modal presentation of information may help. Individuals with attention challenges often have the most difficulty attending to purely auditory information, and this may be even more the case for Stephen with his verbal weaknesses. Combining modes of presentation, such as utilizing visual material along with an oral presentation helps.     Brief motor breaks should be subtly inserted into lengthy classwork for Stephen (e.g., allow him to sharpen pencils, allow him to get up and move around from his virtual schooling setting) in order to support focus and performance. It is advisable to schedule these breaks and provide additional ones when he  gives signals that he needs one.    Along these lines, it is critical that breaks, free time, and recess be  protected time  for Stephen. He should not be required to use break time to make up work he was unable to complete in the time allotted. The research has shown that breaks are critical to  refueling  academic endurance and are extremely important for children with concerns for attention.    Stephen will benefit from breaking larger tasks into smaller manageable parts so multiple steps do not become overwhelming.     Stephen will benefit from preferential seating, perhaps near the teacher and/or away from distracting peers.     For fine motor needs, we recommend:  o A reduction in the frequency of hand-written assignments in addition to extended time allotted for him to complete assignments will help accommodate Stephen s fine-motor challenges.  o Instruction on keyboarding and dictation, along with access to a laptop/computer may be beneficial for writing assignments.   o Provision of notes or presentations in the classroom setting.     Home    The book What to Do When Your Temper Flares by Esther Alvarez is an interactive book with evidence-based techniques that give Stephen a language to think about his anger outbursts and how to handle them. Completing this book with his parents may give a common vocabulary and a method for the family to follow to help Stephen s outbursts or other periods of stress or strain.     Stephen s attentional challenges means that he may struggle to maintain attention for longer periods of time, which can often feel like disobedience or ignoring grown ups  instructions. The following strategies can support Stephen s focus:   o Stephen will benefit from opportunities for physical outlets to increase his behavioral control during home and community tasks. For example, he may be asked to refill a water pitcher during dinner to support his ability to sit at the table for longer. Such an activity will  allow him a break and will also model for him the appropriate ways to manage his energy.  o Secure Stephen ruiz attention before communicating important information or giving him instructions. Eye contact should be made, and it may also be helpful for parents to calmly place a hand on Stephen ruiz shoulder or arm to assist in direction of his attention.  o Only give Stephen one direction at a time and allow him to complete that task before giving him second or third directions. He will need assistance in breaking down multi-step tasks (such as cleaning up his room), so that he can complete each individual step in the correct sequence without skipping any.  o   o Provide access to breaks: Teach Stephen to request a break when needed.    Resources    Stephen ruiz parents may benefit from participating in the [facility] [place] CONNECTED program, which is a free [facility]-wide xcdfot-xw-iliups support program for families with children with special health care needs. They may be interested in receiving support from parents with children who have similar needs and experiences to Stephen, or they themselves may consider being advocates to other families with children who have similar medical conditions. For more information,  Stefan parents are encouraged to call [phone] or visit the following website: [email or web address].org/     The Child Neurology Foundation can be a great resource for parents navigating the journey of disease diagnosis, management, and care of children with neurologic conditions, including epilepsy. We encourage Stephen ruiz parents to look through the numerous tools and resources listed on their website (https://www.childneurologyfoundation.org/tools-resources/) relating to topics such as behavior management, mental health, and other advocacy/parenting support topics.     Executive functioning resource:  https://genetic.org/executive-function-101-e-fkmj-xhzp-resource-parents-teachers-children-executive-function-issues/     Executive Skills in Children and Adolescents, Third Edition: A Practical Guide to Assessment and Intervention by Leah Palacios and Abdi Hung is an accessible manual intended for educators and parents that reviews the development of executive functions as well as a variety of strategies for improving executive skills both through environmental modifications and individual skills training.    It has been a pleasure working with Stephen and his family. If you have any questions or concerns regarding this evaluation, please call the Pediatric Neuropsychology Clinic at [phone].    Melissa Ayala, Ph.D. (she/her)  Postdoctoral Fellow  Division of Clinical Behavioral Neuroscience  [facility]    Danielle Myers, Ph.D., L.P. (she/her)   of Pediatrics  Pediatric Neuropsychology  Division of Clinical Behavioral Neuroscience  [facility]    PEDIATRIC NEUROPSYCHOLOGY CLINIC  CONFIDENTIAL TEST SCORES    Note: These scores are intended for appropriately licensed professionals and should never be interpreted without consideration of the attached narrative report.    Test Results:  Note: The test data listed below use one or more of the following formats:    Standard Scores have an average of 100 and a standard deviation of 15 (the average range is 85 to 115).    Scaled Scores have an average of 10 and a standard deviation of 3 (the average range is 7 to 13).    T-Scores have an average of 50 and a standard deviation of 10 (the average range is 40 to 60).       COGNITIVE FUNCTIONING  Wechsler Intelligence Scale for Children, Fifth Edition   Standard scores from 85 - 115 represent the average range of functioning.  Scaled scores from 7 - 13 represent the average range of functioning.    Index Standard Score   Verbal Comprehension 100   Visual Spatial 108   Fluid  Reasoning 88   Working Memory 100   Processing Speed 98   Full Scale IQ 95     Subtest Raw Score Scaled Score   Similarities 13 6   Vocabulary 25 14   Information 14 10   Block Design 18 9   Visual Puzzles 17 14   Matrix Reasoning 13 8   Figure Weights 13 8   Digit Span 23 11   Picture Span 20 9   Coding 26 9   Symbol Search 18 10     ATTENTION AND EXECUTIVE FUNCTIONING  Test of Variables of Attention, Visual  Scores from 85 - 115 represent the average range of functioning.      Measure Quarter 1 Quarter 2 Quarter 3 Quarter 4 Total   Omissions 55 <40 <40 <40 <40   Commissions 99 109 90 92 95   Response Time 61 69 86 96 83   Variability 70 69 78 88 75     Alivia-Edgar Executive Function System Verbal Fluency Test  Scaled Scores from 7 - 13 represent the average range of functioning.    Measure Raw Score Scaled Score   Letter Fluency 20 12   Category Fluency 19 8   Category Switching Total Correct 8 10   Category Switching Total Switching Accuracy 3 7     Errors Raw Score Scaled Score   Set Loss Errors 5 6   Repetition Errors 12 1     Alivia-Edgar Executive Function System Trail Making Test  Scaled Scores from 7 - 13 represent the average range of functioning.  Percentile scores greater than 16 represent the average range.    Measure Raw Score Scaled Score   Visual Scanning 41 10   Number Sequencing 50 12   Letter Sequencing 103 4   Number-Letter Switching 240 3   Motor Speed  13     Error Raw Score Percentile   Visual Scanning Errors 0 100   Number Sequencing         Sequence Errors 0 100       Set Loss Errors 0 100   Letter Sequencing         Sequence Errors 1 15       Set Loss Errors 0 100   Number-Letter Switching         Sequence Errors 1 45       Set Loss Errors 1 35     Behavior Rating Inventory of Executive Function, 2nd Ed.  T-scores 65 and higher are considered to be in the  clinically significant  range.     OT Evaluation     March 2022 Current   Index/Scale Parent T-Score Teacher T-Score   Inhibit 58 47    Self-Monitor 58 45   Behavior Regulation Index 59 46   Shift 67 40   Emotional Control 69 66   Emotion Regulation Index 82 52   Initiate 75 40   Working Memory 64 49   Plan/Organize 63 42   Task Monitor 58 54   Organization of Materials 57 44   Cognitive Regulation Index 64 47   Global Executive Composite 69 47     LEARNING AND MEMORY  Child and Adolescent Memory Profile (CHAMP)  Standard scores from 85 - 115 represent the average range of functioning.   Scaled scores from 7 - 13 represent the average range of functioning.     Subtest  Raw Score Scaled Score   Instructions  31 12   Instructions Delayed 16 12   Instructions Recognition 22 11   Objects 43 12   Objects Delayed 19 9     FINE MOTOR AND VISUAL-MOTOR FUNCTIONING  Purdue Pegboard  Standard scores from 85 - 115 represent the average range of functioning.    Trial Drops Pegs Placed Standard Score   Dominant (R) 1 11 84   Non-Dominant  1 9 69   Both Hands 1 7 pairs 72     Amisha-Josue Developmental Test of Visual Motor Integration, Sixth Edition  Standard scores from 85 - 115 represent the average range of functioning.    Raw Score Standard Score   17 84     ADAPTIVE FUNCTIONING  [place] Adaptive Behavior Scales, 3rd Edition   Standard scores from 85 - 115 represent the average range of functioning.  v-scaled scores from 12  - 18 represent the average range of functioning.  Age equivalents in Years:Months     Mother   Domain Raw Score v-Scaled Score Standard Score Age Equivalent   Communication Domain - - 88 -      Receptive 72 14 - 5:3      Expressive 91 13 - 4:8      Written 47 13 - 7:0   Daily Living Skills Domain - - 95 -      Personal 97 14 - 6:6      Domestic 34 15 - 7:6      Community 50 13 - 6:7   Socialization Domain - - 82 -      Interpersonal Relationships 54 11 - 2:9      Play and Leisure Time 51 13 - 4:8      Coping Skills 32 11 - 2:6   Motor Domain - - 100 -      Gross 86 17 - 9:10+      Fine 62 13 - 6:3   Adaptive Behavior Composite - - 85  -     EMOTIONAL AND BEHAVIORAL FUNCTIONING  For the Clinical Scales on the BASC-3, scores ranging from 60-69 are considered to be in the  at-risk  range and scores of 70 or higher are considered  clinically significant.   For the Adaptive Scales, scores between 30 and 39 are considered to be in the  at-risk  range and scores of 29 or lower are considered  clinically significant.      Behavior Assessment System for Children, 3rd Edition, Parent Response Form    Mother   Clinical Scales T-Score  Adaptive Scales T-Score   Hyperactivity 57  Adaptability 42   Aggression 72  Social Skills 43   Conduct Problems  65  Leadership 38   Anxiety 51  Activities of Daily Living 46   Depression 53  Functional Communication 42   Somatization 56      Atypicality 55  Composite Indices    Withdrawal 53  Externalizing Problems 67   Attention Problems 51  Internalizing Problems 54      Behavioral Symptoms Index 59      Adaptive Skills 41     Behavior Assessment System for Children, 3rd Edition, Teacher Response Form    Ms. Nora Jaffe   Clinical Scales T-Score  Adaptive Scales T-Score   Hyperactivity 49  Adaptability 49   Aggression 51  Social Skills 59   Conduct Problems  45  Leadership 55   Anxiety 42  Study Skills 50   Depression 50  Functional Communication 47   Somatization 67      Attention Problems 49  Composite Indices    Learning Problems 49  Externalizing Problems 48   Atypicality 50  Internalizing Problems 54   Withdrawal 43  School Problems 49      Behavioral Symptoms Index 48      Adaptive Skills 52     Time Spent: Neuropsychological test administration and scoring by a trainee ([id number]) was administered by Melissa Ayala, Ph.D. on 12/06/2022. Total time spent was 3.5 hours. Neuropsychological test evaluation services by a licensed psychologist ([id number] and [id number]) was administered by Danielle Myers, Ph.D., L.P. on 12/06/2022. Total time spent was 6 hours.    CC    Copy to patient  SAVANNAH TRINH  GARY TRINH  [address] [facility] [address]

## 2024-06-03 ENCOUNTER — OFFICE VISIT (OUTPATIENT)
Dept: HEMATOLOGY/ONCOLOGY | Facility: HOSPITAL | Age: 84
End: 2024-06-03
Attending: INTERNAL MEDICINE
Payer: MEDICARE

## 2024-06-03 VITALS
SYSTOLIC BLOOD PRESSURE: 128 MMHG | HEART RATE: 71 BPM | RESPIRATION RATE: 16 BRPM | OXYGEN SATURATION: 98 % | HEIGHT: 68.5 IN | WEIGHT: 121.38 LBS | DIASTOLIC BLOOD PRESSURE: 52 MMHG | TEMPERATURE: 98 F | BODY MASS INDEX: 18.19 KG/M2

## 2024-06-03 DIAGNOSIS — D50.9 IRON DEFICIENCY ANEMIA, UNSPECIFIED IRON DEFICIENCY ANEMIA TYPE: Primary | ICD-10-CM

## 2024-06-03 PROCEDURE — 96365 THER/PROPH/DIAG IV INF INIT: CPT

## 2024-06-03 PROCEDURE — 96376 TX/PRO/DX INJ SAME DRUG ADON: CPT

## 2024-06-03 NOTE — PROGRESS NOTES
Irfan to infusion today for Iron Dextran. Arrives alert and ambulating with a cane. States he had some weakness last month and missed a step and fell. Otherwise denies any issues or concerns, or symptoms of anemia.     Ordering MD: ARNIE Martinez  Most recent labs 5/10/24- Hgb/hematocrit: 10.8/34.3    Fe: 25    TIBC: 358    %sat: 7    Transferrin: 240    Ferritin: 12.4     PIV started to left forearm with good blood return. Test dose of Iron Dextran given and patient tolerated well with 15 minute observation. Remainder of dose given over 1 hour and patient tolerated infusion without difficulty or complaint. PIV flushed and removed, applied 2x2 gauze and coban to site. Reviewed next apt date/time: labs and F/U scheduled with Dr. Chino Sept. 3rd.      Education Record  Learner:  Patient  Disease / Diagnosis: ALBERTO  Barriers / Limitations:  None  Method:  Discussion and Printed material  General Topics:  Medication, Precautions, Side effects and symptom management, and Plan of care reviewed  Outcome:  Shows understanding

## 2024-06-20 RX ORDER — ROSUVASTATIN CALCIUM 10 MG/1
10 TABLET, COATED ORAL DAILY
Qty: 90 TABLET | Refills: 3 | OUTPATIENT
Start: 2024-06-20

## 2024-06-20 RX ORDER — METOPROLOL SUCCINATE 25 MG/1
25 TABLET, EXTENDED RELEASE ORAL DAILY
Qty: 90 TABLET | Refills: 3 | OUTPATIENT
Start: 2024-06-20

## 2024-06-23 DIAGNOSIS — F51.01 PRIMARY INSOMNIA: ICD-10-CM

## 2024-06-26 RX ORDER — TEMAZEPAM 15 MG/1
15 CAPSULE ORAL NIGHTLY PRN
Qty: 90 CAPSULE | Refills: 0 | Status: SHIPPED | OUTPATIENT
Start: 2024-06-26

## 2024-06-26 NOTE — TELEPHONE ENCOUNTER
Please Review. Protocol Failed; No Protocol   Requested Prescriptions     Pending Prescriptions Disp Refills    TEMAZEPAM 15 MG Oral Cap [Pharmacy Med Name: Temazepam 15 MG Oral Capsule] 90 capsule 0     Sig: TAKE 1 CAPSULE BY MOUTH NIGHTLY AS NEEDED CAN  INCREASE  TO  2  CAPSULES  IF  NEEDED       Recent fills: Quantity: 90, 30  03/12/2024 03/12/2024                                                                    Patient is due  Last Rx written: 03/11/2024  Last Office Visit: 04/15/2024  Future Appointments   Date Time Provider Department Center   10/14/2024  2:40 PM [facility]           Requested Prescriptions   Pending Prescriptions Disp Refills    TEMAZEPAM 15 MG Oral Cap [Pharmacy Med Name: Temazepam 15 MG Oral Capsule] 90 capsule 0     Sig: TAKE 1 CAPSULE BY MOUTH NIGHTLY AS NEEDED CAN  INCREASE  TO  2  CAPSULES  IF  NEEDED       Controlled Substance Medication Failed - 6/23/2024  2:18 PM        Failed - This medication is a controlled substance - forward to provider to refill               Future Appointments         Provider Department Appt Notes    In 3 weeks Basil Griffin MD [facility] concerns (policy informed)    In 2 months CMA RESOURCE [facility] Hematology Oncology [id number]    In 2 months Gwen Chino MD [facility] Hematology Oncology 3 MONTH FOLLOW UP    In 3 months Kash Andujar MD [facility], [facility] 6 mo f/u          Recent Outpatient Visits              3 weeks ago Iron deficiency anemia, unspecified iron deficiency anemia type    [facility] - Infusion    Office Visit    1 month ago     [facility] Hematology Oncology    Nurse Only    1 month ago Leukocytosis, unspecified type    [facility] Hematology Oncology Sohan Weir MD    Office Visit    2 months ago Encounter for annual health examination     [facility], [facility], Kash Lyn MD    Office Visit    8 months ago Pancreatic cyst (HCC)    [facility], [facility], Basil Vicente MD    Office Visit

## 2024-07-17 ENCOUNTER — OFFICE VISIT (OUTPATIENT)
Facility: CLINIC | Age: 84
End: 2024-07-17

## 2024-07-17 ENCOUNTER — TELEPHONE (OUTPATIENT)
Facility: CLINIC | Age: 84
End: 2024-07-17

## 2024-07-17 VITALS
DIASTOLIC BLOOD PRESSURE: 64 MMHG | SYSTOLIC BLOOD PRESSURE: 112 MMHG | HEIGHT: 68.5 IN | HEART RATE: 78 BPM | WEIGHT: 124.5 LBS | BODY MASS INDEX: 18.65 KG/M2

## 2024-07-17 DIAGNOSIS — K86.2 PANCREATIC CYST (HCC): ICD-10-CM

## 2024-07-17 DIAGNOSIS — K51.00 ULCERATIVE PANCOLITIS (HCC): ICD-10-CM

## 2024-07-17 DIAGNOSIS — K51.20 CHRONIC ULCERATIVE PROCTITIS WITHOUT COMPLICATIONS (HCC): Primary | ICD-10-CM

## 2024-07-17 DIAGNOSIS — K86.2 PANCREAS CYST (HCC): Primary | ICD-10-CM

## 2024-07-17 PROCEDURE — 3074F SYST BP LT 130 MM HG: CPT | Performed by: INTERNAL MEDICINE

## 2024-07-17 PROCEDURE — 99214 OFFICE O/P EST MOD 30 MIN: CPT | Performed by: INTERNAL MEDICINE

## 2024-07-17 PROCEDURE — 1160F RVW MEDS BY RX/DR IN RCRD: CPT | Performed by: INTERNAL MEDICINE

## 2024-07-17 PROCEDURE — 1159F MED LIST DOCD IN RCRD: CPT | Performed by: INTERNAL MEDICINE

## 2024-07-17 PROCEDURE — 3008F BODY MASS INDEX DOCD: CPT | Performed by: INTERNAL MEDICINE

## 2024-07-17 PROCEDURE — 3078F DIAST BP <80 MM HG: CPT | Performed by: INTERNAL MEDICINE

## 2024-07-17 PROCEDURE — 1126F AMNT PAIN NOTED NONE PRSNT: CPT | Performed by: INTERNAL MEDICINE

## 2024-07-17 NOTE — PROGRESS NOTES
Prasad Dennis is a 83 year old male.    HPI:     Chief Complaint   Patient presents with    Follow - Up     Pancreatic cyst     The patient is an 83-year-old male who has a history of ulcerative proctitis, coronary artery disease, high blood pressure, high cholesterol, pneumonia, prior cholecystectomy, pancreatic cyst who follows up in the office today.     The patient reports he is doing well from a GI perspective, he is on Apriso 4 tablets daily and denies any GI issues, has about 1 bowel movement per day without blood.  His weight is stable. Appetite intact.     He does have a cyst in the pancreas, this had been noticed and followed up on in 2019, 2020 and then 2023.  2-year recall was advised.     The patient has reported fatigue, difficulty sleeping/insomnia and increased weakness.  He is walking now with a cane.  He is anemic and has been getting IV iron infusions.    Patient is overdue for colonoscopy.  His last colonoscopy was in 2019.  Given his anemia advised that we set this up.  He reports he will be out of town until November.     He did report some burning like sensation after his prior MRI with gadolinium.  He also felt fatigued for about a day or so after the procedure.  Requesting to be done without gadolinium any further MRI imaging.         HISTORY:  Past Medical History:    CAD (coronary artery disease)    Stent placement    High blood pressure    High cholesterol    Pneumonia due to organism      Past Surgical History:   Procedure Laterality Date    Cataract      Cath drug eluting stent      Cholecystectomy      Colonoscopy      Colonoscopy N/A 9/4/2019    Procedure: COLONOSCOPY;  Surgeon: Basil Griffin MD;  Location: [facility] ENDOSCOPY    Hernia surgery        No family history on file.   Social History:   Social History     Socioeconomic History    Marital status:    Tobacco Use    Smoking status: Never    Smokeless tobacco: Never   Vaping Use    Vaping status: Never Used   Substance  and Sexual Activity    Alcohol use: No    Drug use: No    Sexual activity: Never     Partners: Female        Medications (Active prior to today's visit):  Current Outpatient Medications   Medication Sig Dispense Refill    polyethylene glycol, PEG 3350-KCl-NaBcb-NaCl-NaSulf, 236 g Oral Recon Soln Take 4,000 mL by mouth once for 1 dose. As directed by GI clinic instructions. 4000 mL 0    temazepam 15 MG Oral Cap Take 1 capsule (15 mg total) by mouth nightly as needed for Sleep. 90 capsule 0    ergocalciferol 1.25 MG (44609 UT) Oral Cap Take 1 capsule (50,000 Units total) by mouth once a week. 12 capsule 1    cyanocobalamin 1000 MCG/ML Injection Solution Inject 1 mL (1,000 mcg total) into the muscle every 30 (thirty) days. 1 mL 12    clotrimazole-betamethasone 1-0.05 % External Cream Apply 1 Application topically 2 (two) times daily as needed. 15 g 2    APRISO 0.375 g Oral Capsule SR 24 Hr Take 4 capsules (1.5 g total) by mouth daily. 120 capsule 11    rosuvastatin 10 MG Oral Tab Take 1 tablet (10 mg total) by mouth daily. 90 tablet 3    metoprolol succinate ER 25 MG Oral Tablet 24 Hr Take 1 tablet (25 mg total) by mouth daily. 90 tablet 3    Losartan Potassium-HCTZ 100-12.5 MG Oral Tab Take 1 tablet by mouth daily. 90 tablet 3    aspirin 81 MG Oral Chew Tab Chew 1 tablet (81 mg total) by mouth daily.         Allergies:  Allergies   Allergen Reactions    Codeine OTHER (SEE COMMENTS)     tachycardia         ROS:   The patient denies any chest pain or shortness of breath,  No neurologic or dermatologic symptoms.     PHYSICAL EXAM:   Blood pressure 112/64, pulse 78, height 5' 8.5\" (1.74 m), weight 124 lb 8 oz (56.5 kg).    The patient appears their stated age and is in no acute distress  HEENT- anicteric sclera, neck no lymphadnopathy, OP- clear with no masses or lesions  Chest- Clear bilaterally, no wheezing,  Heart- regular rate, no murmur or gallop  Abdomen- Soft and nontender, nondistended  Ext- no clubbing or  cyanosis  Skin- no rashes or lesions  Neuro- appropriate response, alert, no confusion  .  ASSESSMENT/PLAN:   Assessment     The patient is an 83-year-old male who has a history of ulcerative proctitis continues on Apriso tablets.  He has been doing well from a GI perspective, he denies any blood per rectum.  He does have anemia and I have advised colonoscopy to reevaluate and make sure that there is not been any progression, we did discuss the increased risk of malignancy with chronic inflammation.  He will set this up when he gets back from visiting relatives.    He does have history of pancreatic cysts, repeat MRI/MRCP in November 2025.  He requests without IV gadolinium.    Plan  Ulcerative proctitis  - continue on Apriso   - colonoscopy with Golytely and MAC   - lab work every 6 months  - Anemia- continue follow up with heme     Pancreatic cyst  - MRCP ( without contrast) in Nov 2025    Colonoscopy consent: I have discussed the risks, benefits, and alternatives to colonoscopy with the patient [who demonstrated understanding], including but not limited to the risks of bleeding, infection, pain, death, as well as the risks of anesthesia and perforation all leading to prolonged hospitalization, surgical intervention, or even death. I also specifically mentioned the miss rate of colonoscopy of 5-10% in the best of all circumstances. All questions were answered to the patient’s satisfaction. The patient signed informed consent and elected to proceed with colonoscopy with intervention [i.e. polypectomy, stent placement, etc.] as indicated.         Orders This Visit:  No orders of the defined types were placed in this encounter.      Meds This Visit:  Requested Prescriptions     Signed Prescriptions Disp Refills    polyethylene glycol, PEG 3350-KCl-NaBcb-NaCl-NaSulf, 236 g Oral Recon Soln 4000 mL 0     Sig: Take 4,000 mL by mouth once for 1 dose. As directed by GI clinic instructions.       Imaging & Referrals:  None        Basil Griffin MD  [facility]

## 2024-07-17 NOTE — TELEPHONE ENCOUNTER
Scheduled for:  Colonoscopy 62[address]  Provider Name:  Dr. Griffin  Date:  1/23/25  Location:[facility]  Sedation:  MAC  Time: 12:30 Pm ,(pt is aware that Endo will call the day before to confirm arrival time)   Prep:  Golytely   Meds/Allergies Reconciled?:  Physician Reviewed   Diagnosis with codes:  Pancreatic Cyst K86.2,Ulcerative pancolitis K51.00  Was patient informed to call insurance with codes (Y/N):  Yes  Referral sent?:  Referral was sent at the time of electronic surgical scheduling.  [facility] or [facility] notified?:  I sent an electronic request to Endo Scheduling and received a confirmation today.  Medication Orders:  Pt is aware to NOT take iron pills, herbal meds and diet supplements for 7 days before exam. Also to NOT take any form of alcohol, recreational drugs and any forms of ED meds 24 hours before exam.   Misc Orders:  Patient was informed about the new cancellation policy for his/her procedure. Patient was also given a copy of the cancellation policy at the time of the appointment and verbalized understanding.      Further instructions given by staff:  I provide prep instructions to patient at the time of the appointment and reviewed date and location, patient verbalized that he understood and is aware to call if he has any questions.

## 2024-07-17 NOTE — PATIENT INSTRUCTIONS
Plan  Ulcerative proctitis  - continue on Apriso   - colonoscopy with Golytely and MAC   - lab work every 6 months  - Anemia- continue follow up with heme     Pancreatic cyst  - MRCP ( without contrast) in Nov 2025

## 2024-08-09 ENCOUNTER — APPOINTMENT (OUTPATIENT)
Dept: HEMATOLOGY/ONCOLOGY | Facility: HOSPITAL | Age: 84
End: 2024-08-09
Attending: INTERNAL MEDICINE
Payer: MEDICARE

## 2024-08-27 ENCOUNTER — TELEPHONE (OUTPATIENT)
Dept: HEMATOLOGY/ONCOLOGY | Facility: HOSPITAL | Age: 84
End: 2024-08-27

## 2024-08-27 NOTE — TELEPHONE ENCOUNTER
Called patient to reschedule canceled 9/3/24 appointment with lab one hour prior. Patient states he is back in town 10/26/24. Called 8/27/24

## 2024-09-03 ENCOUNTER — APPOINTMENT (OUTPATIENT)
Dept: HEMATOLOGY/ONCOLOGY | Facility: HOSPITAL | Age: 84
End: 2024-09-03
Attending: INTERNAL MEDICINE
Payer: MEDICARE

## 2024-09-13 ENCOUNTER — TELEPHONE (OUTPATIENT)
Dept: HEMATOLOGY/ONCOLOGY | Facility: HOSPITAL | Age: 84
End: 2024-09-13

## 2024-09-13 NOTE — TELEPHONE ENCOUNTER
REBELM to move the 10/31/24 appt for labs and Luis Daniel Eller to another day. She is out of the office 9/13/24

## 2024-09-16 ENCOUNTER — PATIENT MESSAGE (OUTPATIENT)
Facility: CLINIC | Age: 84
End: 2024-09-16

## 2024-09-17 NOTE — TELEPHONE ENCOUNTER
Future Appointments   Date Time Provider Department Center   10/28/2024 11:20 AM Kash Andujar MD [facility]   10/30/2024  2:00 PM CMA RESOURCE [facility] HEM ONC EMO   10/30/2024  3:00 PM Alyson Eller MD [facility] HEM ONC EMO   1/23/2025 12:30 PM ARACELI ISRAEL [id number] None

## 2024-09-17 NOTE — TELEPHONE ENCOUNTER
From: Prasad Dennis  To: Kash Andujar  Sent: 9/16/2024 3:13 PM CDT  Subject: Flu shot    Hello:    On my next visit on October 26, a flu shot may also be arranged.    Thanks.    Prasad

## 2024-09-27 DIAGNOSIS — F51.01 PRIMARY INSOMNIA: ICD-10-CM

## 2024-10-01 RX ORDER — TEMAZEPAM 15 MG/1
15 CAPSULE ORAL NIGHTLY PRN
Qty: 90 CAPSULE | Refills: 0 | Status: SHIPPED | OUTPATIENT
Start: 2024-10-01

## 2024-10-01 NOTE — TELEPHONE ENCOUNTER
Please Review. Protocol Failed; No Protocol     Requested Prescriptions   Pending Prescriptions Disp Refills    TEMAZEPAM 15 MG Oral Cap [Pharmacy Med Name: Temazepam 15 MG Oral Capsule] 90 capsule 0     Sig: TAKE 1 CAPSULE BY MOUTH NIGHTLY AS NEEDED FOR SLEEP       Controlled Substance Medication Failed - 9/27/2024 11:31 AM        Failed - This medication is a controlled substance - forward to provider to refill               Future Appointments         Provider Department Appt Notes    In 3 weeks Kash Andujar MD [facility] General check up    In 4 weeks CMA RESOURCE [facility] Hematology Oncology [id number]  LVM to reschedule appt  out of office this day, called 9/12/24, 9/13/24    In 4 weeks Alyson Eller MD [facility] Hematology Oncology [facility] to reschedule appt  out of office this day, called 9/12/24, 9/13/24    In 3 months ARACELI GRIFFIN [facility] Colon with Mac @[facility]          Recent Outpatient Visits              2 months ago Chronic ulcerative proctitis without complications (HCC)    [facility] Basil Griffin MD    Office Visit    4 months ago Iron deficiency anemia, unspecified iron deficiency anemia type    [facility] - Infusion    Office Visit    4 months ago     [facility] Hematology Oncology    Nurse Only    4 months ago Leukocytosis, unspecified type    [facility] Hematology Oncology Sohan Weir MD    Office Visit    5 months ago Encounter for annual health examination    [facility] Kash Andujar MD    Office Visit

## 2024-10-28 ENCOUNTER — OFFICE VISIT (OUTPATIENT)
Facility: CLINIC | Age: 84
End: 2024-10-28
Payer: COMMERCIAL

## 2024-10-28 VITALS
TEMPERATURE: 98 F | RESPIRATION RATE: 18 BRPM | HEART RATE: 88 BPM | DIASTOLIC BLOOD PRESSURE: 68 MMHG | BODY MASS INDEX: 18.88 KG/M2 | HEIGHT: 68.5 IN | SYSTOLIC BLOOD PRESSURE: 122 MMHG | WEIGHT: 126 LBS

## 2024-10-28 DIAGNOSIS — K51.90 ULCERATIVE COLITIS WITHOUT COMPLICATIONS, UNSPECIFIED LOCATION (HCC): ICD-10-CM

## 2024-10-28 DIAGNOSIS — I10 ESSENTIAL HYPERTENSION: Primary | ICD-10-CM

## 2024-10-28 DIAGNOSIS — I25.10 CORONARY ARTERY DISEASE INVOLVING NATIVE CORONARY ARTERY OF NATIVE HEART WITHOUT ANGINA PECTORIS: ICD-10-CM

## 2024-10-28 DIAGNOSIS — D72.829 LEUKOCYTOSIS, UNSPECIFIED TYPE: ICD-10-CM

## 2024-10-28 DIAGNOSIS — E78.5 HYPERLIPIDEMIA, UNSPECIFIED HYPERLIPIDEMIA TYPE: ICD-10-CM

## 2024-10-28 DIAGNOSIS — D64.9 ANEMIA, UNSPECIFIED TYPE: ICD-10-CM

## 2024-10-28 DIAGNOSIS — R29.898 WEAKNESS OF BOTH LOWER EXTREMITIES: ICD-10-CM

## 2024-10-28 RX ORDER — ALBUTEROL SULFATE 90 UG/1
2 INHALANT RESPIRATORY (INHALATION) EVERY 4 HOURS PRN
Qty: 1 EACH | Refills: 1 | Status: SHIPPED | OUTPATIENT
Start: 2024-10-28 | End: 2024-11-27

## 2024-10-28 NOTE — PROGRESS NOTES
HPI:    Patient ID: Prasad Dennis is a 83 year old male.    HPI    Patient returns to the office today to discuss chronic medical issues as listed on the active problem list below.  Patient last seen in the office by me on April 15 for Medicare advantage AHA visit.  During the last visit, the following changes were made: None.  Blood work at that time did note persistent anemia and leukocytosis.  Since the last visit, the patient has seen the following doctors: Hematology.  Evaluation revealed iron deficiency.  He was given IV iron infusions. He will be getting labs tomorrow.     Today, the patient offers the following complaints: Weakness in both legs for 3-4 days. No precipitating factors. No swelling or bruising. After the iron infusions, he is not sure he felt any better. He thinks the iron deficiency may have been related hemorrhoids. Endoscopy will be done in January. He checked his BP a few weeks ago- it is normal.   Patient describes diet as healthy. Rice, lentil, chicken, bread, eggs. He takes Ensure every morning.   For exercise, the patient is not that active other than moving around in the home. He has an issues if he is too active.   Tobacco and alcohol use reviewed.   Current medications reviewed.   Health maintenance issues reviewed.      Wt Readings from Last 6 Encounters:   10/28/24 126 lb (57.2 kg)   07/17/24 124 lb 8 oz (56.5 kg)   06/03/24 121 lb 6.4 oz (55.1 kg)   05/10/24 122 lb (55.3 kg)   04/15/24 125 lb (56.7 kg)   10/04/23 125 lb (56.7 kg)       Patient Active Problem List   Diagnosis    Primary hypertension    Hyperlipemia    Ulcerative colitis without complications (HCC)    Coronary artery disease involving native heart without angina pectoris    Palpitation    Pancreatic cyst (HCC)    Sepsis (HCC)    Pulmonary nodules    Precordial pain    Chronic ulcerative enterocolitis without complication (HCC)    CAP (community acquired pneumonia)    Iron deficiency anemia        HISTORY:  Past  Medical History:    CAD (coronary artery disease)    Stent placement    High blood pressure    High cholesterol    Pneumonia due to organism      Past Surgical History:   Procedure Laterality Date    Cataract      Cath drug eluting stent      Cholecystectomy      Colonoscopy      Colonoscopy N/A 9/4/2019    Procedure: COLONOSCOPY;  Surgeon: Basil Griffin MD;  Location: [facility] ENDOSCOPY    Hernia surgery        History reviewed. No pertinent family history.   Social History     Socioeconomic History    Marital status:    Tobacco Use    Smoking status: Never    Smokeless tobacco: Never   Vaping Use    Vaping status: Never Used   Substance and Sexual Activity    Alcohol use: No    Drug use: No    Sexual activity: Never     Partners: Female          Review of Systems          Current Outpatient Medications   Medication Sig Dispense Refill    temazepam 15 MG Oral Cap Take 1 capsule (15 mg total) by mouth nightly as needed for Sleep. 90 capsule 0    ergocalciferol 1.25 MG (89243 UT) Oral Cap Take 1 capsule (50,000 Units total) by mouth once a week. 12 capsule 1    cyanocobalamin 1000 MCG/ML Injection Solution Inject 1 mL (1,000 mcg total) into the muscle every 30 (thirty) days. 1 mL 12    clotrimazole-betamethasone 1-0.05 % External Cream Apply 1 Application topically 2 (two) times daily as needed. 15 g 2    APRISO 0.375 g Oral Capsule SR 24 Hr Take 4 capsules (1.5 g total) by mouth daily. 120 capsule 11    rosuvastatin 10 MG Oral Tab Take 1 tablet (10 mg total) by mouth daily. 90 tablet 3    metoprolol succinate ER 25 MG Oral Tablet 24 Hr Take 1 tablet (25 mg total) by mouth daily. 90 tablet 3    Losartan Potassium-HCTZ 100-12.5 MG Oral Tab Take 1 tablet by mouth daily. 90 tablet 3    aspirin 81 MG Oral Chew Tab Chew 1 tablet (81 mg total) by mouth daily.       Allergies:Allergies[1]     PHYSICAL EXAM:   /68 (BP Location: Left arm, Patient Position: Sitting, Cuff Size: large)   Pulse 88   Temp 98 °F (36.7  °C) (Other)   Resp 18   Ht 5' 8.5\" (1.74 m)   Wt 126 lb (57.2 kg)   BMI 18.88 kg/m²      Physical Exam  Constitutional:       Appearance: Normal appearance. He is well-developed.   Eyes:      Conjunctiva/sclera: Conjunctivae normal.   Neck:      Vascular: No carotid bruit.   Cardiovascular:      Rate and Rhythm: Normal rate and regular rhythm.      Pulses: Normal pulses.      Heart sounds: Normal heart sounds. No murmur heard.  Pulmonary:      Effort: Pulmonary effort is normal.      Breath sounds: Normal breath sounds. No wheezing or rales.   Abdominal:      General: Bowel sounds are normal.      Palpations: Abdomen is soft. There is no mass.      Tenderness: There is no abdominal tenderness.   Musculoskeletal:      Right lower leg: No edema.      Left lower leg: No edema.   Lymphadenopathy:      Cervical: No cervical adenopathy.   Skin:     General: Skin is warm and dry.      Findings: No rash.   Neurological:      General: No focal deficit present.      Mental Status: He is alert.   Psychiatric:         Mood and Affect: Mood normal.         Behavior: Behavior normal.         Thought Content: Thought content normal.                 ASSESSMENT/PLAN:   1. Essential hypertension  Blood pressure is well-controlled.  Continue current treatment.  Work on diet and exercise.  If things are going well.  Follow-up in 6 months.    2. Leukocytosis, unspecified type  He has seen hematology.  Status post 1 iron infusion.  He will be getting repeat blood work tomorrow.  We will see if the anemia and leukocytosis improved.    3. Anemia, unspecified type  Iron deficiency anemia.  Now status post iron infusion.  Will be getting repeat test tomorrow.  Await results.    4. Coronary artery disease involving native coronary artery of native heart without angina pectoris  Asymptomatic.  No chest pain or breathing issues.  Continue current treatment follow-up with cardiology.    5. Hyperlipidemia, unspecified hyperlipidemia  type  Stable.  Continue with rosuvastatin.  Check blood work regularly.    6. Ulcerative colitis without complications, unspecified location (HCC)  Controlled.  Continue with Apriso.     7. Leg weakness  Patient with a few days of bilateral lower leg weakness.  Exam is normal.  No swelling, tenderness, redness, warmth.  No precipitating accident or change in activity.  We will monitor for now.  Contact the office if things are not improving in the coming days.    Meds This Visit:  Requested Prescriptions      No prescriptions requested or ordered in this encounter       Imaging & Referrals:  INFLUENZA VAC HIGH DOSE PRSV FREE         Kash Andujar MD          [1]   Allergies  Allergen Reactions    Codeine OTHER (SEE COMMENTS)     tachycardia

## 2024-10-30 ENCOUNTER — OFFICE VISIT (OUTPATIENT)
Dept: HEMATOLOGY/ONCOLOGY | Facility: HOSPITAL | Age: 84
End: 2024-10-30
Attending: INTERNAL MEDICINE
Payer: MEDICARE

## 2024-10-30 VITALS
RESPIRATION RATE: 16 BRPM | HEART RATE: 86 BPM | WEIGHT: 125.38 LBS | HEIGHT: 68.5 IN | SYSTOLIC BLOOD PRESSURE: 136 MMHG | TEMPERATURE: 98 F | OXYGEN SATURATION: 96 % | DIASTOLIC BLOOD PRESSURE: 58 MMHG | BODY MASS INDEX: 18.78 KG/M2

## 2024-10-30 DIAGNOSIS — D64.9 ANEMIA, UNSPECIFIED TYPE: Primary | ICD-10-CM

## 2024-10-30 DIAGNOSIS — D64.9 ANEMIA, UNSPECIFIED TYPE: ICD-10-CM

## 2024-10-30 DIAGNOSIS — D72.829 LEUKOCYTOSIS, UNSPECIFIED TYPE: ICD-10-CM

## 2024-10-30 LAB
BASOPHILS # BLD AUTO: 0.08 X10(3) UL (ref 0–0.2)
BASOPHILS NFR BLD AUTO: 0.5 %
DEPRECATED HBV CORE AB SER IA-ACNC: 87 NG/ML
DEPRECATED RDW RBC AUTO: 44.9 FL (ref 35.1–46.3)
EOSINOPHIL # BLD AUTO: 0.31 X10(3) UL (ref 0–0.7)
EOSINOPHIL NFR BLD AUTO: 2.1 %
ERYTHROCYTE [DISTWIDTH] IN BLOOD BY AUTOMATED COUNT: 13.7 % (ref 11–15)
HCT VFR BLD AUTO: 37.2 %
HGB BLD-MCNC: 11.7 G/DL
IMM GRANULOCYTES # BLD AUTO: 0.09 X10(3) UL (ref 0–1)
IMM GRANULOCYTES NFR BLD: 0.6 %
IRON SATN MFR SERPL: 8 %
IRON SERPL-MCNC: 22 UG/DL
LYMPHOCYTES # BLD AUTO: 2.01 X10(3) UL (ref 1–4)
LYMPHOCYTES NFR BLD AUTO: 13.3 %
MCH RBC QN AUTO: 28.1 PG (ref 26–34)
MCHC RBC AUTO-ENTMCNC: 31.5 G/DL (ref 31–37)
MCV RBC AUTO: 89.4 FL
MONOCYTES # BLD AUTO: 1.64 X10(3) UL (ref 0.1–1)
MONOCYTES NFR BLD AUTO: 10.8 %
NEUTROPHILS # BLD AUTO: 10.99 X10 (3) UL (ref 1.5–7.7)
NEUTROPHILS # BLD AUTO: 10.99 X10(3) UL (ref 1.5–7.7)
NEUTROPHILS NFR BLD AUTO: 72.7 %
PLATELET # BLD AUTO: 328 10(3)UL (ref 150–450)
RBC # BLD AUTO: 4.16 X10(6)UL
TIBC SERPL-MCNC: 277 UG/DL (ref 250–425)
TRANSFERRIN SERPL-MCNC: 186 MG/DL (ref 215–365)
WBC # BLD AUTO: 15.1 X10(3) UL (ref 4–11)

## 2024-10-30 PROCEDURE — 36415 COLL VENOUS BLD VENIPUNCTURE: CPT

## 2024-10-30 PROCEDURE — 85025 COMPLETE CBC W/AUTO DIFF WBC: CPT

## 2024-10-30 PROCEDURE — 82728 ASSAY OF FERRITIN: CPT

## 2024-10-30 PROCEDURE — 83540 ASSAY OF IRON: CPT

## 2024-10-30 PROCEDURE — 84466 ASSAY OF TRANSFERRIN: CPT

## 2024-10-30 PROCEDURE — 99214 OFFICE O/P EST MOD 30 MIN: CPT | Performed by: STUDENT IN AN ORGANIZED HEALTH CARE EDUCATION/TRAINING PROGRAM

## 2024-10-31 ENCOUNTER — APPOINTMENT (OUTPATIENT)
Dept: HEMATOLOGY/ONCOLOGY | Facility: HOSPITAL | Age: 84
End: 2024-10-31
Attending: INTERNAL MEDICINE
Payer: MEDICARE

## 2024-11-04 RX ORDER — FERROUS GLUCONATE 324(37.5)
1 TABLET ORAL DAILY
Qty: 30 TABLET | Refills: 5 | Status: SHIPPED | OUTPATIENT
Start: 2024-11-04 | End: 2025-05-03

## 2024-11-04 NOTE — PROGRESS NOTES
Hematology consult    5/10/2024    Leukocytosis anemia    Requesting: Dr. Andujar    Hematology History     - 7710-0440: Mild leukocytosis noted.   2024 noted to have mild anemia with hemoglobin of 10.4, leukocytosis.  Revealed iron deficiency.  Patient received IV iron.      HPI:  83 year old clinic to establish follow-up.  Patient denies having any constitutional symptoms.  Denies any active bleeding.  Infections.      Recent Labs   Lab 10/30/24  1243   RBC 4.16   HGB 11.7*   HCT 37.2*   MCV 89.4   MCH 28.1   MCHC 31.5   RDW 13.7   NEPRELIM 10.99*   WBC 15.1*   .0           Past Medical History:    CAD (coronary artery disease)    Stent placement    High blood pressure    High cholesterol    Pneumonia due to organism     Social History     Socioeconomic History    Marital status:    Tobacco Use    Smoking status: Never    Smokeless tobacco: Never   Vaping Use    Vaping status: Never Used   Substance and Sexual Activity    Alcohol use: No    Drug use: No    Sexual activity: Never     Partners: Female     No family history on file.  Current Outpatient Medications on File Prior to Visit   Medication Sig Dispense Refill    albuterol 108 (90 Base) MCG/ACT Inhalation Aero Soln Inhale 2 puffs into the lungs every 4 (four) hours as needed for Wheezing. 1 each 1    temazepam 15 MG Oral Cap Take 1 capsule (15 mg total) by mouth nightly as needed for Sleep. 90 capsule 0    ergocalciferol 1.25 MG (31270 UT) Oral Cap Take 1 capsule (50,000 Units total) by mouth once a week. 12 capsule 1    cyanocobalamin 1000 MCG/ML Injection Solution Inject 1 mL (1,000 mcg total) into the muscle every 30 (thirty) days. 1 mL 12    clotrimazole-betamethasone 1-0.05 % External Cream Apply 1 Application topically 2 (two) times daily as needed. 15 g 2    APRISO 0.375 g Oral Capsule SR 24 Hr Take 4 capsules (1.5 g total) by mouth daily. 120 capsule 11    rosuvastatin 10 MG Oral Tab Take 1 tablet (10 mg total) by mouth daily. 90 tablet  3    metoprolol succinate ER 25 MG Oral Tablet 24 Hr Take 1 tablet (25 mg total) by mouth daily. 90 tablet 3    Losartan Potassium-HCTZ 100-12.5 MG Oral Tab Take 1 tablet by mouth daily. 90 tablet 3    aspirin 81 MG Oral Chew Tab Chew 1 tablet (81 mg total) by mouth daily.       No current facility-administered medications on file prior to visit.     Vitals:    10/30/24 1303   BP: 136/58   Pulse: 86   Resp: 16   Temp: 98.3 °F (36.8 °C)     No acute distress alert and oriented regular rate nondistended normal mood no lymphadenopathy no deformity      A/P:  83 year old with cytosis and iron deficiency anemia.    Anemia  -Secondary to iron deficiency.  Status post IV iron.  Improvement in his hemoglobin was 11.7.  Ferritin is adequate, however he remains to low iron saturation and iron deficiency.  He has history of ulcerative proctitis is currently on Apriso ( Mesalamine)   Patient will follow back with gastroenterology for a colonoscopy in January 2024.  -Continue oral iron.      -- leukocytosis   -Neutrophilia, along with absolute monocytosis.    -, Stable.  Discussed with the patient that this raises the concern for either myelo dysplastic or proliferative neoplasm.  However since it has been stable we will continue to monitor clinically.  If it continues to worsen or progress, we will proceed with a bone marrow biopsy.      After GI workup with repeat CBC and iron panel.

## 2024-11-17 ENCOUNTER — PATIENT MESSAGE (OUTPATIENT)
Facility: CLINIC | Age: 84
End: 2024-11-17

## 2024-11-26 RX ORDER — MESALAMINE 375 MG/1
1.5 CAPSULE, EXTENDED RELEASE ORAL DAILY
Qty: 120 CAPSULE | Refills: 1 | Status: SHIPPED | OUTPATIENT
Start: 2024-11-26

## 2024-11-26 NOTE — TELEPHONE ENCOUNTER
Requested Prescriptions     Pending Prescriptions Disp Refills    APRISO 0.375 g Oral Capsule SR 24 Hr [Pharmacy Med Name: Apriso 0.375 GM Oral Capsule Extended Release 24 Hour] 120 capsule 0     Sig: TAKE 4 CAPSULES BY MOUTH ONCE DAILY       LOV 7/17/2024  LR  10/04/2023  Routed to MD on call    em

## 2024-12-13 NOTE — TELEPHONE ENCOUNTER
Please send prescription of the following medecine to my pharmacy:     Metoprolol ER 25 mg.     Thank you.     Prasad Dennis

## 2024-12-17 ENCOUNTER — PATIENT MESSAGE (OUTPATIENT)
Facility: CLINIC | Age: 84
End: 2024-12-17

## 2024-12-17 RX ORDER — METOPROLOL SUCCINATE 25 MG/1
25 TABLET, EXTENDED RELEASE ORAL DAILY
Qty: 90 TABLET | Refills: 3 | OUTPATIENT
Start: 2024-12-17

## 2024-12-17 RX ORDER — METOPROLOL SUCCINATE 25 MG/1
25 TABLET, EXTENDED RELEASE ORAL DAILY
Qty: 90 TABLET | Refills: 1 | Status: SHIPPED | OUTPATIENT
Start: 2024-12-17

## 2024-12-17 NOTE — TELEPHONE ENCOUNTER
Please review. Protocol failed/ No protocol.    Requested Prescriptions   Pending Prescriptions Disp Refills    METOPROLOL SUCCINATE ER 25 MG Oral Tablet 24 Hr [Pharmacy Med Name: Metoprolol Succinate ER 25 MG Oral Tablet Extended Release 24 Hour] 90 tablet 0     Sig: Take 1 tablet by mouth once daily       Hypertension Medications Protocol Failed - 12/17/2024 11:11 AM        Failed - CMP or BMP in past 12 months        Failed - EGFRCR or GFRNAA > 50     GFR Evaluation            Passed - Last BP reading less than 140/90     BP Readings from Last 1 Encounters:   10/30/24 136/58               Passed - In person appointment or virtual visit in the past 12 mos or appointment in next 3 mos     Recent Outpatient Visits              1 month ago Anemia, unspecified type    [facility] Hematology Oncology Alyson Eller MD    Office Visit    1 month ago Leukocytosis, unspecified type    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    In 1 month CMA RESOURCE Ramila RONQUILLO [facility] Hematology Oncology [place] CBC-PIV    In 1 month Alyson Eller MD [facility] Hematology Oncology [place] follow up visit.cl  3m    In 3 months Kash Andujar MD [facility] [place] [place]

## 2024-12-18 NOTE — TELEPHONE ENCOUNTER
Disp Refills Start End    METOPROLOL SUCCINATE ER 25 MG Oral Tablet 24 Hr 90 tablet 1 12/17/2024 --    Sig - Route: Take 1 tablet by mouth once daily - Oral    Sent to pharmacy as: Metoprolol Succinate ER 25 MG Oral Tablet Extended Release 24 Hour (Toprol XL)    E-Prescribing Status: Receipt confirmed by pharmacy (12/17/2024  2:58 PM CST)      Pharmacy    [facility] [address] [place] [address] [phone], [phone]

## 2024-12-30 ENCOUNTER — PATIENT MESSAGE (OUTPATIENT)
Facility: CLINIC | Age: 84
End: 2024-12-30

## 2025-01-02 ENCOUNTER — PATIENT MESSAGE (OUTPATIENT)
Facility: CLINIC | Age: 85
End: 2025-01-02

## 2025-01-03 ENCOUNTER — TELEPHONE (OUTPATIENT)
Facility: CLINIC | Age: 85
End: 2025-01-03

## 2025-01-06 ENCOUNTER — PATIENT MESSAGE (OUTPATIENT)
Facility: CLINIC | Age: 85
End: 2025-01-06

## 2025-01-06 RX ORDER — ROSUVASTATIN CALCIUM 10 MG/1
10 TABLET, COATED ORAL DAILY
Qty: 90 TABLET | Refills: 0 | OUTPATIENT
Start: 2025-01-06

## 2025-01-06 RX ORDER — ROSUVASTATIN CALCIUM 10 MG/1
10 TABLET, COATED ORAL DAILY
Qty: 90 TABLET | Refills: 3 | Status: SHIPPED | OUTPATIENT
Start: 2025-01-06

## 2025-01-06 NOTE — TELEPHONE ENCOUNTER
Please review; protocol failed/No Protocol    No Active/Future labs pended     Requested Prescriptions   Pending Prescriptions Disp Refills    rosuvastatin 10 MG Oral Tab 90 tablet 3     Sig: Take 1 tablet (10 mg total) by mouth daily.       Cholesterol Medication Protocol Failed - 1/6/2025  3:22 PM        Failed - ALT < 80     Lab Results   Component Value Date    ALT 21 08/08/2023             Failed - ALT resulted within past year        Failed - Lipid panel within past 12 months     Lab Results   Component Value Date    CHOLEST 92 08/08/2023    TRIG 83 08/08/2023    HDL 42 08/08/2023    LDL 33 08/08/2023    VLDL 11 08/08/2023    NONHDLC 50 08/08/2023             Passed - In person appointment or virtual visit in the past 12 mos or appointment in next 3 mos     Recent Outpatient Visits              2 months ago Anemia, unspecified type    [facility] Hematology Oncology Alyson Eller MD    Office Visit    2 months ago Leukocytosis, unspecified type    [facility] Hematology Oncology    Nurse Only    2 months ago Essential hypertension    [facility] Kash Andujar MD    Office Visit    5 months ago 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 Hematology Oncology [place] follow up visit.cl  3m    In 2 months Kash Andujar MD [facility]  [facility]                        Future Appointments         Provider Department Appt Notes    In 2 weeks ARACELI GRIFFIN [facility] Colon with Mac @Em    In 3 weeks CMA RESOURCE Ramila Lim [facility] Hematology Oncology [place] CBC-PIV    In 3 weeks Alyson Eller MD [facility] Hematology Oncology [place] follow up visit.cl  3m    In 2 months Kash Andujar MD 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 Basil Griffin MD    Office Visit    7 months ago Iron deficiency anemia, unspecified iron deficiency anemia type    [facility] - Infusion    Office Visit

## 2025-01-23 ENCOUNTER — PATIENT MESSAGE (OUTPATIENT)
Facility: CLINIC | Age: 85
End: 2025-01-23

## 2025-01-23 ENCOUNTER — TELEPHONE (OUTPATIENT)
Facility: CLINIC | Age: 85
End: 2025-01-23

## 2025-01-23 ENCOUNTER — HOSPITAL ENCOUNTER (OUTPATIENT)
Facility: HOSPITAL | Age: 85
Setting detail: HOSPITAL OUTPATIENT SURGERY
Discharge: HOME | End: 2025-01-23
Attending: INTERNAL MEDICINE | Admitting: INTERNAL MEDICINE
Payer: MEDICARE

## 2025-01-23 ENCOUNTER — ANESTHESIA EVENT (OUTPATIENT)
Dept: ENDOSCOPY | Facility: HOSPITAL | Age: 85
End: 2025-01-23
Payer: MEDICARE

## 2025-01-23 ENCOUNTER — ANESTHESIA (OUTPATIENT)
Dept: ENDOSCOPY | Facility: HOSPITAL | Age: 85
End: 2025-01-23
Payer: MEDICARE

## 2025-01-23 VITALS
WEIGHT: 125 LBS | OXYGEN SATURATION: 98 % | HEART RATE: 65 BPM | BODY MASS INDEX: 18.51 KG/M2 | SYSTOLIC BLOOD PRESSURE: 118 MMHG | RESPIRATION RATE: 12 BRPM | DIASTOLIC BLOOD PRESSURE: 63 MMHG | HEIGHT: 69 IN

## 2025-01-23 DIAGNOSIS — K86.2 PANCREAS CYST (HCC): ICD-10-CM

## 2025-01-23 DIAGNOSIS — K51.00 ULCERATIVE PANCOLITIS (HCC): ICD-10-CM

## 2025-01-23 PROBLEM — Z12.11 COLON CANCER SCREENING: Status: RESOLVED | Noted: 2025-01-23 | Resolved: 2025-01-23

## 2025-01-23 PROBLEM — Z12.11 COLON CANCER SCREENING: Status: ACTIVE | Noted: 2025-01-23

## 2025-01-23 PROCEDURE — 0DBG8ZX EXCISION OF LEFT LARGE INTESTINE, VIA NATURAL OR ARTIFICIAL OPENING ENDOSCOPIC, DIAGNOSTIC: ICD-10-PCS | Performed by: INTERNAL MEDICINE

## 2025-01-23 PROCEDURE — 0DBB8ZX EXCISION OF ILEUM, VIA NATURAL OR ARTIFICIAL OPENING ENDOSCOPIC, DIAGNOSTIC: ICD-10-PCS | Performed by: INTERNAL MEDICINE

## 2025-01-23 PROCEDURE — 45380 COLONOSCOPY AND BIOPSY: CPT | Performed by: INTERNAL MEDICINE

## 2025-01-23 PROCEDURE — 0DBP8ZX EXCISION OF RECTUM, VIA NATURAL OR ARTIFICIAL OPENING ENDOSCOPIC, DIAGNOSTIC: ICD-10-PCS | Performed by: INTERNAL MEDICINE

## 2025-01-23 PROCEDURE — 0DBF8ZX EXCISION OF RIGHT LARGE INTESTINE, VIA NATURAL OR ARTIFICIAL OPENING ENDOSCOPIC, DIAGNOSTIC: ICD-10-PCS | Performed by: INTERNAL MEDICINE

## 2025-01-23 RX ORDER — MESALAMINE 0.38 G/1
1.5 CAPSULE, EXTENDED RELEASE ORAL EVERY MORNING
Qty: 120 CAPSULE | Refills: 5 | Status: SHIPPED | OUTPATIENT
Start: 2025-01-23 | End: 2025-07-22

## 2025-01-23 RX ORDER — MESALAMINE 375 MG/1
CAPSULE, EXTENDED RELEASE ORAL
Qty: 120 CAPSULE | Refills: 0 | Status: SHIPPED | OUTPATIENT
Start: 2025-01-23

## 2025-01-23 RX ORDER — SODIUM CHLORIDE, SODIUM LACTATE, POTASSIUM CHLORIDE, CALCIUM CHLORIDE 600; 310; 30; 20 MG/100ML; MG/100ML; MG/100ML; MG/100ML
INJECTION, SOLUTION INTRAVENOUS CONTINUOUS
Status: DISCONTINUED | OUTPATIENT
Start: 2025-01-23 | End: 2025-01-23

## 2025-01-23 RX ORDER — NALOXONE HYDROCHLORIDE 0.4 MG/ML
0.08 INJECTION, SOLUTION INTRAMUSCULAR; INTRAVENOUS; SUBCUTANEOUS ONCE AS NEEDED
Status: DISCONTINUED | OUTPATIENT
Start: 2025-01-23 | End: 2025-01-23

## 2025-01-23 RX ORDER — MESALAMINE 0.38 G/1
1.5 CAPSULE, EXTENDED RELEASE ORAL DAILY
Qty: 120 CAPSULE | Refills: 2 | Status: SHIPPED | OUTPATIENT
Start: 2025-01-23

## 2025-01-23 RX ORDER — LIDOCAINE HYDROCHLORIDE 10 MG/ML
INJECTION, SOLUTION EPIDURAL; INFILTRATION; INTRACAUDAL; PERINEURAL AS NEEDED
Status: DISCONTINUED | OUTPATIENT
Start: 2025-01-23 | End: 2025-01-23 | Stop reason: SURG

## 2025-01-23 RX ADMIN — LIDOCAINE HYDROCHLORIDE 50 MG: 10 INJECTION, SOLUTION EPIDURAL; INFILTRATION; INTRACAUDAL; PERINEURAL at 12:41:00

## 2025-01-23 NOTE — ANESTHESIA PREPROCEDURE EVALUATION
Anesthesia PreOp Note    HPI:     Prasad Dennis is a 84 year old male who presents for preoperative consultation requested by: Basil Griffin MD    Date of Surgery: 1/23/2025    Procedure(s):  COLONOSCOPY  Indication: Pancreas cyst (HCC) / Ulcerative pancolitis (HCC)    Relevant Problems   No relevant active problems       NPO:  Last Liquid Consumption Date: 01/23/25  Last Liquid Consumption Time: 0730  Last Solid Consumption Date: 01/22/25  Last Solid Consumption Time: 1200  Last Liquid Consumption Date: 01/23/25          History Review:  Patient Active Problem List    Diagnosis Date Noted    Colon cancer screening 01/23/2025    Iron deficiency anemia 05/10/2024    Chronic ulcerative enterocolitis without complication (HCC) 03/15/2023    Precordial pain 11/28/2022    Pancreatic cyst (HCC) 07/10/2019    Sepsis (HCC) 05/17/2019    Pulmonary nodules 05/17/2019    CAP (community acquired pneumonia) 05/17/2019    Palpitation 03/14/2019    Coronary artery disease involving native heart without angina pectoris 03/07/2019    Primary hypertension 08/09/2018    Hyperlipemia 08/09/2018    Ulcerative colitis without complications (HCC) 08/09/2018       Past Medical History:    CAD (coronary artery disease)    Stent placement    Colitis    Coronary atherosclerosis    High blood pressure    High cholesterol    Muscle weakness    cane    Pneumonia due to organism    Visual impairment    glasses       Past Surgical History:   Procedure Laterality Date    Cataract      Cath drug eluting stent      Cholecystectomy      Colonoscopy      Colonoscopy N/A 9/4/2019    Procedure: COLONOSCOPY;  Surgeon: Basil Griffin MD;  Location: [facility] ENDOSCOPY    Hernia surgery         Prescriptions Prior to Admission[1]  Current Medications and Prescriptions Ordered in Epic[2]    Allergies[3]    History reviewed. No pertinent family history.  Social History     Socioeconomic History    Marital status:    Tobacco Use    Smoking status:  Never    Smokeless tobacco: Never   Vaping Use    Vaping status: Never Used   Substance and Sexual Activity    Alcohol use: No    Drug use: No    Sexual activity: Never     Partners: Female       Available pre-op labs reviewed.  Lab Results   Component Value Date    WBC 15.1 (H) 10/30/2024    RBC 4.16 10/30/2024    HGB 11.7 (L) 10/30/2024    HCT 37.2 (L) 10/30/2024    MCV 89.4 10/30/2024    MCH 28.1 10/30/2024    MCHC 31.5 10/30/2024    RDW 13.7 10/30/2024    .0 10/30/2024             Vital Signs:  Body mass index is 18.46 kg/m².   height is 1.753 m (5' 9\") and weight is 56.7 kg (125 lb). His blood pressure is 130/59 and his pulse is 73. His respiration is 12 and oxygen saturation is 98%.   Vitals:    01/16/25 1749 01/23/25 1207   BP:  130/59   Pulse:  73   Resp:  12   SpO2:  98%   Weight: 57.2 kg (126 lb) 56.7 kg (125 lb)   Height: 1.753 m (5' 9\") 1.753 m (5' 9\")        Anesthesia Evaluation     Patient summary reviewed and Nursing notes reviewed    Airway   Mallampati: II  TM distance: >3 FB  Neck ROM: full  Dental - Dentition appears grossly intact     Pulmonary - normal exam   (+) COPD  Cardiovascular - normal exam  (+) hypertension, CAD    Rhythm: regular  Rate: normal    Neuro/Psych - negative ROS     GI/Hepatic/Renal    (+) bowel prep    Endo/Other - negative ROS   Abdominal                  Anesthesia Plan:   ASA:  2  Plan:   MAC  Informed Consent Plan and Risks Discussed With:  Patient  Discussed plan with:  Surgeon      I have informed Aronemory Dennis and/or legal guardian or family member of the nature of the anesthetic plan, benefits, risks including possible dental damage if relevant, major complications, and any alternative forms of anesthetic management.   All of the patient's questions were answered to the best of my ability. The patient desires the anesthetic management as planned.  Michael Velasquez CRNA  1/23/2025 12:34 PM  Present on Admission:  **None**           [1]   Medications Prior to  Admission   Medication Sig Dispense Refill Last Dose/Taking    rosuvastatin 10 MG Oral Tab Take 1 tablet (10 mg total) by mouth daily. 90 tablet 3 2025    METOPROLOL SUCCINATE ER 25 MG Oral Tablet 24 Hr Take 1 tablet by mouth once daily 90 tablet 1 2025    Ferrous Gluconate 324 (37.5 Fe) MG Oral Tab Take 1 tablet (324 mg total) by mouth daily. 30 tablet 5 Taking    [] albuterol 108 (90 Base) MCG/ACT Inhalation Aero Soln Inhale 2 puffs into the lungs every 4 (four) hours as needed for Wheezing. 1 each 1 Taking As Needed    temazepam 15 MG Oral Cap Take 1 capsule (15 mg total) by mouth nightly as needed for Sleep. 90 capsule 0 Taking As Needed    ergocalciferol 1.25 MG (16321 UT) Oral Cap Take 1 capsule (50,000 Units total) by mouth once a week. 12 capsule 1 2025    Losartan Potassium-HCTZ 100-12.5 MG Oral Tab Take 1 tablet by mouth daily. 90 tablet 3 2025    polyethylene glycol, PEG 3350-KCl-NaBcb-NaCl-NaSulf, 236 g Oral Recon Soln Take 4,000 mL by mouth As Directed. May substitute prescription with Golytely/Nulytely/Gavilyte and or generic equivalent, if needed. Take bowel preparation as provided by Gastroenterology office, or visit our website at [email or web address] 4000 mL 0     cyanocobalamin 1000 MCG/ML Injection Solution Inject 1 mL (1,000 mcg total) into the muscle every 30 (thirty) days. 1 mL 12     clotrimazole-betamethasone 1-0.05 % External Cream Apply 1 Application topically 2 (two) times daily as needed. 15 g 2     aspirin 81 MG Oral Chew Tab Chew 1 tablet (81 mg total) by mouth daily. (Patient not taking: Reported on 2025)   Not Taking   [2]   Current Facility-Administered Medications Ordered in Epic   Medication Dose Route Frequency Provider Last Rate Last Admin    lactated ringers infusion   Intravenous Continuous Basil Griffin MD         No current [facility]-ordered outpatient medications on file.   [3]    Allergies  Allergen Reactions    Codeine OTHER (SEE COMMENTS)     tachycardia

## 2025-01-23 NOTE — ANESTHESIA POSTPROCEDURE EVALUATION
Patient: Prasad Dennis    Procedure Summary       Date: 01/23/25 Room / Location: [facility] ENDOSCOPY 04 / [facility] ENDOSCOPY    Anesthesia Start: 1237 Anesthesia Stop:     Procedure: COLONOSCOPY with biopsies Diagnosis:       Pancreas cyst (HCC)      Ulcerative pancolitis (HCC)      (diverticulosis, hemorrhoids)    Surgeons: Basil Griffin MD Anesthesiologist: Michael Velasquez CRNA    Anesthesia Type: MAC ASA Status: 2            Anesthesia Type: MAC    Vitals Value Taken Time   BP 96/52 01/23/25 1311   Temp  01/23/25 1311   Pulse 77 01/23/25 1311   Resp 16 01/23/25 1311   SpO2 97 01/23/25 1311       [facility] AN Post Evaluation:   Patient Evaluated in PACU  Patient Participation: complete - patient participated  Level of Consciousness: awake, awake and alert and responsive to verbal stimuli  Pain Score: 0  Pain Management: adequate  Airway Patency:patent  Yes    Nausea/Vomiting: none  Cardiovascular Status: acceptable, stable and hemodynamically stable  Respiratory Status: spontaneous ventilation, nonlabored ventilation and room air  Postoperative Hydration acceptable      Michael Velasquez CRNA  1/23/2025 1:11 PM

## 2025-01-23 NOTE — TELEPHONE ENCOUNTER
Requested Prescriptions     Pending Prescriptions Disp Refills    APRISO 0.375 g Oral Capsule SR 24 Hr [Pharmacy Med Name: Apriso 0.375 GM Oral Capsule Extended Release 24 Hour] 120 capsule 0     Sig: TAKE 4 CAPSULES (1.5 GRAM) BY MOUTH ONCE DAILY     Last seen: 7/17/24  Suggested follow up:  Next appointment: 1/23/25 (procedure)  Last refill: 11/26/24    Refill pended, please review/sign if agreeable.

## 2025-01-23 NOTE — DISCHARGE INSTRUCTIONS
Home Care Instructions for Colonoscopy with Sedation    Diet:  - Resume your regular diet as tolerated unless otherwise instructed.  - Start with light meals to minimize bloating.  - Do not drink alcohol today.    Medication:  - If you have questions about resuming your normal medications, please contact your Primary Care Physician.    Activities:  - Take it easy today. Do not return to work today.  - Do not drive today.  - Do not operate any machinery today (including kitchen equipment).    Colonoscopy:  - You may notice some rectal \"spotting\" (a little blood on the toilet tissue) for a day or two after the exam. This is normal.  - If you experience any rectal bleeding (not spotting), persistent tenderness or sharp severe abdominal pains, oral temperature over 100 degrees Fahrenheit, light-headedness or dizziness, or any other problems, contact your doctor.    **If unable to reach your doctor, please go to the [facility] Emergency Room**    - Your referring physician will receive a full report of your examination.  - If you do not hear from your doctor's office within two weeks of your biopsy, please call them for your results.    You may be able to see your laboratory results in [facility] between 4 and 7 business days.  In some cases, your physician may not have viewed the results before they are released to [facility].  If you have questions regarding your results contact the physician who ordered the test/exam by phone or via [facility] by choosing \"Ask a Medical Question.\"

## 2025-01-23 NOTE — OPERATIVE REPORT
[facility] Endoscopy Report  Date of procedure-January 23, 2025    Preoperative Diagnosis:  -History of ulcerative proctitis  -Anemia    Postoperative Diagnosis:  -Ileitis  -Diverticulosis  -Internal hemorrhoids      Procedure:    Colonoscopy     Surgeon:  Basil Griffin M.D.    Anesthesia:  MAC     Technique:  After informed consent, the patient was placed in the left lateral recumbent position.  Digital rectal examination revealed no palpable intraluminal abnormalities.  An Olympus variable stiffness 190 series HD colonoscope was inserted into the rectum and advanced under direct vision by following the lumen to the cecum.  The colon was examined upon withdrawal in the left lateral position.    The procedure was well tolerated without immediate complication.      Findings:  The preparation of the colon was good.  The terminal ileum was examined for 7 cm and showed subtle patchy erythema 1 small inflammatory polyp status post biopsy.  The ileocecal valve was well preserved. The visualized colonic mucosa from the cecum to the anal verge was normal with an intact vascular pattern.  Samples taken from the right colon, left colon and rectum and labeled in separate containers.    Diverticulosis located in the sigmoid, no diverticulitis.    Small internal hemorrhoids noted on retroflexed view.    Estimated blood loss-insignificant  Specimens-see above    Impression:  -Ileitis  -Diverticulosis  -Internal hemorrhoids    Recommendations:  - Post procedure instructions given  - Repeat colonoscopy based on symptoms /colitis activity- continue current medical regimen  - High fiber diet for diverticular disease  - Symptomatic treatment of hemorrhoids          Basil Griffin MD  1/23/2025  1:02 PM

## 2025-01-23 NOTE — TELEPHONE ENCOUNTER
Dr. Griffin    Patient requesting refill for Apriso to be sent to [facility] pharmacy    Last refill was 11/26/2024.    Thank you

## 2025-01-23 NOTE — H&P
History & Physical Examination    Patient Name: Prasad Dennis  MRN: [id number]  CSN: [id number]  YOB: 1940    Diagnosis: ulcerative procitits        Prescriptions Prior to Admission[1]  Current Facility-Administered Medications   Medication Dose Route Frequency    lactated ringers infusion   Intravenous Continuous       Allergies: Allergies[2]    Past Medical History:    CAD (coronary artery disease)    Stent placement    Colitis    Coronary atherosclerosis    High blood pressure    High cholesterol    Muscle weakness    cane    Pneumonia due to organism    Visual impairment    glasses     Past Surgical History:   Procedure Laterality Date    Cataract      Cath drug eluting stent      Cholecystectomy      Colonoscopy      Colonoscopy N/A 9/4/2019    Procedure: COLONOSCOPY;  Surgeon: Basil Griffin MD;  Location: [facility] ENDOSCOPY    Hernia surgery       History reviewed. No pertinent family history.  Social History     Tobacco Use    Smoking status: Never    Smokeless tobacco: Never   Substance Use Topics    Alcohol use: No       SYSTEM Check if Review is Normal Check if Physical Exam is Normal If not normal, please explain:   HEENT [x ] [ x]    NECK & BACK [x ] [x ]    HEART [x ] [ x]    LUNGS [x ] [ x]    ABDOMEN [x ] [x ]    UROGENITAL [ ] [ ]    EXTREMITIES [x ] [x ]    OTHER        [ x ] I have discussed the risks and benefits and alternatives with the patient/family.  They understand and agree to proceed with plan of care.  [ x ] I have reviewed the History and Physical done within the last 30 days.  Any changes noted above.    Basil Griffin MD  1/23/2025  12:17 PM         [1]   Medications Prior to Admission   Medication Sig Dispense Refill Last Dose/Taking    rosuvastatin 10 MG Oral Tab Take 1 tablet (10 mg total) by mouth daily. 90 tablet 3 1/22/2025    METOPROLOL SUCCINATE ER 25 MG Oral Tablet 24 Hr Take 1 tablet by mouth once daily 90 tablet 1 1/22/2025    Ferrous Gluconate 324 (37.5  Fe) MG Oral Tab Take 1 tablet (324 mg total) by mouth daily. 30 tablet 5 Taking    [] albuterol 108 (90 Base) MCG/ACT Inhalation Aero Soln Inhale 2 puffs into the lungs every 4 (four) hours as needed for Wheezing. 1 each 1 Taking As Needed    temazepam 15 MG Oral Cap Take 1 capsule (15 mg total) by mouth nightly as needed for Sleep. 90 capsule 0 Taking As Needed    ergocalciferol 1.25 MG (80139 UT) Oral Cap Take 1 capsule (50,000 Units total) by mouth once a week. 12 capsule 1 2025    Losartan Potassium-HCTZ 100-12.5 MG Oral Tab Take 1 tablet by mouth daily. 90 tablet 3 2025    polyethylene glycol, PEG 3350-KCl-NaBcb-NaCl-NaSulf, 236 g Oral Recon Soln Take 4,000 mL by mouth As Directed. May substitute prescription with Golytely/Nulytely/Gavilyte and or generic equivalent, if needed. Take bowel preparation as provided by Gastroenterology office, or visit our website at [email or web address] 4000 mL 0     cyanocobalamin 1000 MCG/ML Injection Solution Inject 1 mL (1,000 mcg total) into the muscle every 30 (thirty) days. 1 mL 12     clotrimazole-betamethasone 1-0.05 % External Cream Apply 1 Application topically 2 (two) times daily as needed. 15 g 2     aspirin 81 MG Oral Chew Tab Chew 1 tablet (81 mg total) by mouth daily. (Patient not taking: Reported on 2025)   Not Taking   [2]   Allergies  Allergen Reactions    Codeine OTHER (SEE COMMENTS)     tachycardia

## 2025-01-28 ENCOUNTER — NURSE ONLY (OUTPATIENT)
Age: 85
End: 2025-01-28
Attending: STUDENT IN AN ORGANIZED HEALTH CARE EDUCATION/TRAINING PROGRAM
Payer: MEDICARE

## 2025-01-28 VITALS
HEIGHT: 68.5 IN | RESPIRATION RATE: 16 BRPM | OXYGEN SATURATION: 97 % | HEART RATE: 67 BPM | BODY MASS INDEX: 19.03 KG/M2 | WEIGHT: 127 LBS | TEMPERATURE: 98 F | DIASTOLIC BLOOD PRESSURE: 61 MMHG | SYSTOLIC BLOOD PRESSURE: 131 MMHG

## 2025-01-28 DIAGNOSIS — D72.829 LEUKOCYTOSIS, UNSPECIFIED TYPE: ICD-10-CM

## 2025-01-28 DIAGNOSIS — D50.8 IRON DEFICIENCY ANEMIA SECONDARY TO INADEQUATE DIETARY IRON INTAKE: ICD-10-CM

## 2025-01-28 DIAGNOSIS — D64.9 ANEMIA, UNSPECIFIED TYPE: ICD-10-CM

## 2025-01-28 DIAGNOSIS — E61.1 IRON DEFICIENCY: ICD-10-CM

## 2025-01-28 DIAGNOSIS — D64.9 ANEMIA, UNSPECIFIED TYPE: Primary | ICD-10-CM

## 2025-01-28 LAB
ALBUMIN SERPL-MCNC: 4.2 G/DL (ref 3.2–4.8)
ALBUMIN/GLOB SERPL: 1.7 {RATIO} (ref 1–2)
ALP LIVER SERPL-CCNC: 79 U/L
ALT SERPL-CCNC: 19 U/L
ANION GAP SERPL CALC-SCNC: 6 MMOL/L (ref 0–18)
AST SERPL-CCNC: 21 U/L (ref ?–34)
BASOPHILS # BLD AUTO: 0.06 X10(3) UL (ref 0–0.2)
BASOPHILS NFR BLD AUTO: 0.4 %
BILIRUB SERPL-MCNC: 0.7 MG/DL (ref 0.2–1.1)
BUN BLD-MCNC: 26 MG/DL (ref 9–23)
BUN/CREAT SERPL: 26.8 (ref 10–20)
CALCIUM BLD-MCNC: 9.4 MG/DL (ref 8.7–10.4)
CHLORIDE SERPL-SCNC: 100 MMOL/L (ref 98–112)
CO2 SERPL-SCNC: 29 MMOL/L (ref 21–32)
CREAT BLD-MCNC: 0.97 MG/DL
DEPRECATED HBV CORE AB SER IA-ACNC: 55 NG/ML
DEPRECATED RDW RBC AUTO: 44.2 FL (ref 35.1–46.3)
EGFRCR SERPLBLD CKD-EPI 2021: 78 ML/MIN/1.73M2 (ref 60–?)
EOSINOPHIL # BLD AUTO: 0.14 X10(3) UL (ref 0–0.7)
EOSINOPHIL NFR BLD AUTO: 1 %
ERYTHROCYTE [DISTWIDTH] IN BLOOD BY AUTOMATED COUNT: 13.5 % (ref 11–15)
GLOBULIN PLAS-MCNC: 2.5 G/DL (ref 2–3.5)
GLUCOSE BLD-MCNC: 104 MG/DL (ref 70–99)
HCT VFR BLD AUTO: 38.5 %
HGB BLD-MCNC: 11.9 G/DL
IMM GRANULOCYTES # BLD AUTO: 0.09 X10(3) UL (ref 0–1)
IMM GRANULOCYTES NFR BLD: 0.7 %
IRON SATN MFR SERPL: 10 %
IRON SERPL-MCNC: 25 UG/DL
LYMPHOCYTES # BLD AUTO: 2.09 X10(3) UL (ref 1–4)
LYMPHOCYTES NFR BLD AUTO: 15.5 %
MCH RBC QN AUTO: 27.9 PG (ref 26–34)
MCHC RBC AUTO-ENTMCNC: 30.9 G/DL (ref 31–37)
MCV RBC AUTO: 90.2 FL
MONOCYTES # BLD AUTO: 1.29 X10(3) UL (ref 0.1–1)
MONOCYTES NFR BLD AUTO: 9.5 %
NEUTROPHILS # BLD AUTO: 9.85 X10 (3) UL (ref 1.5–7.7)
NEUTROPHILS # BLD AUTO: 9.85 X10(3) UL (ref 1.5–7.7)
NEUTROPHILS NFR BLD AUTO: 72.9 %
OSMOLALITY SERPL CALC.SUM OF ELEC: 285 MOSM/KG (ref 275–295)
PLATELET # BLD AUTO: 305 10(3)UL (ref 150–450)
POTASSIUM SERPL-SCNC: 4.7 MMOL/L (ref 3.5–5.1)
PROT SERPL-MCNC: 6.7 G/DL (ref 5.7–8.2)
RBC # BLD AUTO: 4.27 X10(6)UL
SODIUM SERPL-SCNC: 135 MMOL/L (ref 136–145)
TOTAL IRON BINDING CAPACITY: 259 UG/DL (ref 250–425)
TRANSFERRIN SERPL-MCNC: 200 MG/DL (ref 215–365)
WBC # BLD AUTO: 13.5 X10(3) UL (ref 4–11)

## 2025-01-28 RX ORDER — MAGNESIUM OXIDE 400 MG (241.3 MG MAGNESIUM) TABLET
1 TABLET DAILY
Qty: 30 TABLET | Refills: 3 | Status: SHIPPED | OUTPATIENT
Start: 2025-01-28

## 2025-02-01 ENCOUNTER — PATIENT MESSAGE (OUTPATIENT)
Facility: CLINIC | Age: 85
End: 2025-02-01

## 2025-02-01 DIAGNOSIS — F51.01 PRIMARY INSOMNIA: ICD-10-CM

## 2025-02-05 RX ORDER — TEMAZEPAM 15 MG/1
15 CAPSULE ORAL NIGHTLY PRN
Qty: 90 CAPSULE | Refills: 0 | Status: SHIPPED | OUTPATIENT
Start: 2025-02-05

## 2025-02-05 NOTE — TELEPHONE ENCOUNTER
Please review; protocol failed/No Protocol    Recent Fills: 10/02/2024-quantity 90 for 90 day supply     Last Rx Written: 10/01/2024    Last Office Visit: 10/28/2024  Future Appointments   Date Time Provider Department Center   4/2/2025 11:20 AM Kash Andujar MD [facility]       Requested Prescriptions   Pending Prescriptions Disp Refills    temazepam 15 MG Oral Cap 90 capsule 0     Sig: Take 1 capsule (15 mg total) by mouth nightly as needed for Sleep.       Controlled Substance Medication Failed - 2/5/2025 11:02 AM        Failed - This medication is a controlled substance - forward to provider to refill        Passed - Medication is active on med list           Future Appointments         Provider Department Appt Notes    In 1 month Kash Andujar MD [facility]     In 2 months CMA RESOURCE [facility] Hematology Oncology [place] CBC, iron studies, ferritin-PIV    In 3 months [facility]son Eller [facility] [place] follow up visit.cl  3m          Recent Outpatient Visits              1 week ago Anemia, unspecified type    [facility] Hematology Oncology [place] Olga Price APRN    Office Visit    1 week ago Anemia, unspecified type    [facility] Hematology Oncology [place]    Nurse Only    3 months ago Anemia, unspecified type    [facility] Hematology Oncology Alyson Eller MD    Office Visit    3 months ago Leukocytosis, unspecified type    [facility] Hematology Oncology    Nurse Only    3 months ago Essential hypertension    [facility] Kash Andujar MD    Office Visit

## 2025-04-01 NOTE — PROGRESS NOTES
Subjective:   Prasad Dennis is a 84 year old male who presents for a MA AHA (Medicare Advantage Annual Health Assessment) and scheduled follow up of multiple significant but stable problems.   History of Present Illness    Patient is here requesting Medicare advantage AHA visit and follow-up on chronic medical problems as listed below.  I last saw him in the office October 20 of last year for checkup.  Also had some weakness in his legs at that time.  He just started getting the iron infusions because of the iron deficiency.  Since that visit he has seen hematology oncology 1 times for the iron deficiency treatment; after the IV rx, he was put on pills.  Repeat blood work showed minimal increase in hemoglobin and persistent iron deficiency.  In addition, he had a colonoscopy done on January 23.  It showed ileitis, diverticulosis, and hemorrhoids.  No new treatment based on that study.  Current medications reviewed.  Health maintenance and vaccination status reviewed.  Advanced directives reviewed.    He is maintaining the same weight. Has some increase in bilateral knee pain especially going down the stairs. No other aches.     Not checking BP at home. He is copliant with meds. Diet is good. For exercise, he goes up and down stairs. He does not go for walks due to feeling unsteady. No recent falls.     History/Other:   Fall Risk Assessment:   He has been screened for Falls and is High Risk. Fall Prevention information provided to patient in After Visit Summary.    Do you feel unsteady when standing or walking?: (Patient-Rptd) Yes  Do you worry about falling?: (Patient-Rptd) Yes  Have you fallen in the past year?: (Patient-Rptd) No     Cognitive Assessment:   He had a completely normal cognitive assessment - see flowsheet entries     Functional Ability/Status:   Prasad Dennis has some abnormal functions as listed below:  He has Vision problems based on screening of functional status. He has Walking problems based  on screening of functional status.       Depression Screening (PHQ):  PHQ-2 SCORE: 0  , done 4/2/2025   If you checked off any problems, how difficult have these problems made it for you to do your work, take care of things at home, or get along with other people?: Not difficult at all    Last [employer] Suicide Screening on 4/2/2025 was No Risk.          Advanced Directives:   He does have a Living Will but we do NOT have it on file in Epic.    He does NOT have a Power of  for Health Care. [Do you have a healthcare power of ?: No]  Discussed Advance Care Planning with patient (and family/surrogate if present). Standard forms made available to patient in After Visit Summary.      Patient Active Problem List   Diagnosis    Primary hypertension    Hyperlipemia    Ulcerative colitis without complications (HCC)    Coronary artery disease involving native heart without angina pectoris    Palpitation    Pancreatic cyst (HCC)    Sepsis (HCC)    Pulmonary nodules    Precordial pain    Chronic ulcerative enterocolitis without complication (HCC)    CAP (community acquired pneumonia)    Iron deficiency anemia     Allergies:  He is allergic to codeine.    Current Medications:  Outpatient Medications Marked as Taking for the 4/2/25 encounter (Office Visit) with Kash Andujar MD   Medication Sig    temazepam 15 MG Oral Cap Take 1 capsule (15 mg total) by mouth nightly as needed for Sleep.    Ferrous Sulfate ER 45 MG Oral Tab CR Take 1 tablet by mouth daily.    APRISO 0.375 g Oral Capsule SR 24 Hr TAKE 4 CAPSULES (1.5 GRAM) BY MOUTH ONCE DAILY    Mesalamine ER 0.375 g Oral Capsule SR 24 Hr Take 4 capsules (1.5 g total) by mouth daily.    rosuvastatin 10 MG Oral Tab Take 1 tablet (10 mg total) by mouth daily.    METOPROLOL SUCCINATE ER 25 MG Oral Tablet 24 Hr Take 1 tablet by mouth once daily    ergocalciferol 1.25 MG (20536 UT) Oral Cap Take 1 capsule (50,000 Units total) by mouth once a week.    cyanocobalamin  1000 MCG/ML Injection Solution Inject 1 mL (1,000 mcg total) into the muscle every 30 (thirty) days.    clotrimazole-betamethasone 1-0.05 % External Cream Apply 1 Application topically 2 (two) times daily as needed.    Losartan Potassium-HCTZ 100-12.5 MG Oral Tab Take 1 tablet by mouth daily.    aspirin 81 MG Oral Chew Tab Chew 1 tablet (81 mg total) by mouth daily.       Medical History:  He  has a past medical history of CAD (coronary artery disease) (2009), Colitis, Coronary atherosclerosis, High blood pressure, High cholesterol, Muscle weakness, Pneumonia due to organism, and Visual impairment.  Surgical History:  He  has a past surgical history that includes colonoscopy; cath drug eluting stent; hernia surgery; cataract; cholecystectomy; colonoscopy (N/A, 09/04/2019); colonoscopy (01/23/2025); and colonoscopy (N/A, 1/23/2025).   Family History:  His family history is not on file.  Social History:  He  reports that he has never smoked. He has never used smokeless tobacco. He reports that he does not drink alcohol and does not use drugs.    Tobacco:  He has never smoked tobacco.    CAGE Alcohol Screen:   CAGE screening score of 0 on 4/2/2025, showing low risk of alcohol abuse.      Patient Care Team:  Kash Andujar MD as PCP - General (Internal Medicine)    Review of Systems   Constitutional:  Negative for chills and fever.   Respiratory:  Negative for shortness of breath.    Cardiovascular:  Negative for chest pain and palpitations.   Gastrointestinal:  Negative for abdominal pain, diarrhea and nausea.          Objective:   Physical Exam  Constitutional:       Appearance: Normal appearance. He is well-developed.   HENT:      Right Ear: Tympanic membrane and ear canal normal.      Left Ear: Tympanic membrane and ear canal normal.      Nose: Nose normal.      Mouth/Throat:      Pharynx: No oropharyngeal exudate or posterior oropharyngeal erythema.   Eyes:      Conjunctiva/sclera: Conjunctivae normal.       Pupils: Pupils are equal, round, and reactive to light.   Neck:      Thyroid: No thyromegaly.      Vascular: No carotid bruit.   Cardiovascular:      Rate and Rhythm: Normal rate and regular rhythm.      Pulses: Normal pulses.      Heart sounds: Normal heart sounds. No murmur heard.  Pulmonary:      Effort: Pulmonary effort is normal.      Breath sounds: Normal breath sounds. No wheezing or rales.   Abdominal:      General: Bowel sounds are normal.      Palpations: Abdomen is soft. There is no mass.      Tenderness: There is no abdominal tenderness.   Musculoskeletal:      Right lower leg: No edema.      Left lower leg: No edema.   Lymphadenopathy:      Cervical: No cervical adenopathy.   Skin:     General: Skin is warm and dry.      Findings: No rash.   Neurological:      General: No focal deficit present.      Mental Status: He is alert.      Cranial Nerves: No cranial nerve deficit.      Coordination: Coordination normal.   Psychiatric:         Mood and Affect: Mood normal.         Behavior: Behavior normal.         Thought Content: Thought content normal.         Judgment: Judgment normal.          /50 (BP Location: Left arm, Patient Position: Sitting, Cuff Size: large)   Pulse 88   Temp 98 °F (36.7 °C) (Other)   Resp 18   Ht 5' 8.5\" (1.74 m)   Wt 121 lb (54.9 kg)   SpO2 96%   BMI 18.13 kg/m²  Estimated body mass index is 18.13 kg/m² as calculated from the following:    Height as of this encounter: 5' 8.5\" (1.74 m).    Weight as of this encounter: 121 lb (54.9 kg).    Medicare Hearing Assessment:   Hearing Screening    Screening Method: Questionnaire  I have a problem hearing over the telephone: No I have trouble following the conversations when two or more people are talking at the same time: No   I have trouble understanding things on the TV: No I have to strain to understand conversations: No   I have to worry about missing the telephone ring or doorbell: No I have trouble hearing conversations  in a noisy background such as a crowded room or restaurant: No   I get confused about where sounds come from: No I misunderstand some words in a sentence and need to ask people to repeat themselves: No   I especially have trouble understanding the speech of women and children: No I have trouble understanding the speaker in a large room such as at a meeting or place of [employer]an: No   Many people I talk to seem to mumble (or don't speak clearly): No People get annoyed because I misunderstand what they say: No   I misunderstand what others are saying and make inappropriate responses: No I avoid social activities because I cannot hear well and fear I will reply improperly: No   Family members and friends have told me they think I may have hearing loss: No             Visual Acuity:   Right Eye Visual Acuity: Corrected Right Eye Chart Acuity: 20/25   Left Eye Visual Acuity: Corrected Left Eye Chart Acuity: 20/25   Both Eyes Visual Acuity: Corrected Both Eyes Chart Acuity: 20/25   Able To Tolerate Visual Acuity: Yes        Assessment & Plan:   Prasad Dennis is a 84 year old male who presents for a Medicare Assessment.       Assessment & Plan  1. Encounter for annual health examination  Physical exam is unremarkable.  Overall the patient is doing well.  Particular given his age and medical issues.  He is getting along reasonably well.  No changes today in medications.  We will order blood test and contact patient with results.  If everything is going well, follow-up in 6 months.    2. Primary insomnia  Stable.  Refill given on temazepam.  No evidence of tolerance or increased usage.  - temazepam 15 MG Oral Cap; Take 1 capsule (15 mg total) by mouth nightly as needed for Sleep.  Dispense: 90 capsule; Refill: 3    3. Essential hypertension  Blood pressure is well-controlled.  Continue current treatment.  Check fasting blood work.  - Comp Metabolic Panel (14)  - Lipid Panel  - TSH W Reflex To Free T4    4. Coronary artery  disease involving native coronary artery of native heart without angina pectoris  Asymptomatic.  Continue current treatment.  Contact us with any kind of chest pain.  Follow-up with cardiology if needed.    5. Hyperlipidemia, unspecified hyperlipidemia type  Check lipid profile.  Adjust dose of rosuvastatin 10 mg a day if needed.  Work on diet and exercise.    6. Ulcerative colitis without complications, unspecified location (HCC)  Stable on medications.  Refill sent.    7. Weakness of both lower extremities  Ongoing weakness in the legs and some difficulty walking.  No recent falls.  No evidence of definite neuropathy.  Offered physical therapy for strengthening and gait training.  Patient declines.  He will let us know if he wants to proceed with that.    8. Iron deficiency  Patient stated he received 1 infusion of iron and since has been on pills.  No major improvement in levels.  He will be seeing hematology soon.    9. Anemia, unspecified type  As above, continue iron supplement and follow-up with hematology.    10. Elevated glucose  No prior history of diabetes.  Check A1c and blood sugar.  - Hemoglobin A1C      The patient indicates understanding of these issues and agrees to the plan.  Reinforced healthy diet, lifestyle, and exercise.      No follow-ups on file.     Kash Andujar MD, 4/2/2025     Supplementary Documentation:   General Health:  In the past six months, have you lost more than 10 pounds without trying?: (Patient-Rptd) 2 - No  Has your appetite been poor?: (Patient-Rptd) No  Type of Diet: (Patient-Rptd) Balanced, Other  How does the patient maintain a good energy level?: (Patient-Rptd) Other  How would you describe your daily physical activity?: (Patient-Rptd) Light  How would you describe your current health state?: (Patient-Rptd) Fair  How do you maintain positive mental well-being?: (Patient-Rptd) Social Interaction  On a scale of 0 to 10, with 0 being no pain and 10 being severe pain, what  is your pain level?: (Patient-Rptd) 1 - (Mild)  In the past six months, have you experienced urine leakage?: (Patient-Rptd) 0-No  At any time do you feel concerned for the safety/well-being of yourself and/or your children, in your home or elsewhere?: No  Have you had any immunizations at another office such as Influenza, Hepatitis B, Tetanus, or Pneumococcal?: (Patient-Rptd) Yes    Health Maintenance   Topic Date Due    Zoster Vaccines (1 of 2) Never done    COVID-19 Vaccine (5 - 2024-25 season) 09/01/2024    Annual Well Visit  01/01/2025    Colorectal Cancer Screening  01/23/2027    Influenza Vaccine  Completed    Annual Depression Screening  Completed    Fall Risk Screening (Annual)  Completed    Pneumococcal Vaccine: 50+ Years  Completed    Meningococcal B Vaccine  Aged Out

## 2025-04-02 ENCOUNTER — OFFICE VISIT (OUTPATIENT)
Dept: INTERNAL MEDICINE CLINIC | Facility: CLINIC | Age: 85
End: 2025-04-02
Payer: COMMERCIAL

## 2025-04-02 VITALS
RESPIRATION RATE: 18 BRPM | TEMPERATURE: 98 F | OXYGEN SATURATION: 96 % | WEIGHT: 121 LBS | DIASTOLIC BLOOD PRESSURE: 50 MMHG | HEART RATE: 88 BPM | BODY MASS INDEX: 18.13 KG/M2 | SYSTOLIC BLOOD PRESSURE: 116 MMHG | HEIGHT: 68.5 IN

## 2025-04-02 DIAGNOSIS — Z00.00 ENCOUNTER FOR ANNUAL HEALTH EXAMINATION: Primary | ICD-10-CM

## 2025-04-02 DIAGNOSIS — I10 ESSENTIAL HYPERTENSION: ICD-10-CM

## 2025-04-02 DIAGNOSIS — K51.90 ULCERATIVE COLITIS WITHOUT COMPLICATIONS, UNSPECIFIED LOCATION (HCC): ICD-10-CM

## 2025-04-02 DIAGNOSIS — R29.898 WEAKNESS OF BOTH LOWER EXTREMITIES: ICD-10-CM

## 2025-04-02 DIAGNOSIS — D64.9 ANEMIA, UNSPECIFIED TYPE: ICD-10-CM

## 2025-04-02 DIAGNOSIS — R73.09 ELEVATED GLUCOSE: ICD-10-CM

## 2025-04-02 DIAGNOSIS — E61.1 IRON DEFICIENCY: ICD-10-CM

## 2025-04-02 DIAGNOSIS — I25.10 CORONARY ARTERY DISEASE INVOLVING NATIVE CORONARY ARTERY OF NATIVE HEART WITHOUT ANGINA PECTORIS: ICD-10-CM

## 2025-04-02 DIAGNOSIS — E78.5 HYPERLIPIDEMIA, UNSPECIFIED HYPERLIPIDEMIA TYPE: ICD-10-CM

## 2025-04-02 DIAGNOSIS — F51.01 PRIMARY INSOMNIA: ICD-10-CM

## 2025-04-02 RX ORDER — ERGOCALCIFEROL 1.25 MG/1
50000 CAPSULE, LIQUID FILLED ORAL WEEKLY
Qty: 12 CAPSULE | Refills: 3 | Status: SHIPPED | OUTPATIENT
Start: 2025-04-02

## 2025-04-02 RX ORDER — ROSUVASTATIN CALCIUM 10 MG/1
10 TABLET, COATED ORAL DAILY
Qty: 90 TABLET | Refills: 3 | Status: SHIPPED | OUTPATIENT
Start: 2025-04-02

## 2025-04-02 RX ORDER — MESALAMINE 0.38 G/1
1.5 CAPSULE, EXTENDED RELEASE ORAL DAILY
Qty: 120 CAPSULE | Refills: 3 | Status: SHIPPED | OUTPATIENT
Start: 2025-04-02

## 2025-04-02 RX ORDER — METOPROLOL SUCCINATE 25 MG/1
25 TABLET, EXTENDED RELEASE ORAL DAILY
Qty: 90 TABLET | Refills: 3 | Status: SHIPPED | OUTPATIENT
Start: 2025-04-02

## 2025-04-02 RX ORDER — TEMAZEPAM 15 MG/1
15 CAPSULE ORAL NIGHTLY PRN
Qty: 90 CAPSULE | Refills: 3 | Status: SHIPPED | OUTPATIENT
Start: 2025-04-02

## 2025-04-02 RX ORDER — CLOTRIMAZOLE AND BETAMETHASONE DIPROPIONATE 10; .64 MG/G; MG/G
1 CREAM TOPICAL 2 TIMES DAILY PRN
Qty: 15 G | Refills: 3 | Status: SHIPPED | OUTPATIENT
Start: 2025-04-02

## 2025-04-02 RX ORDER — LOSARTAN POTASSIUM AND HYDROCHLOROTHIAZIDE 12.5; 1 MG/1; MG/1
1 TABLET ORAL DAILY
Qty: 90 TABLET | Refills: 3 | Status: SHIPPED | OUTPATIENT
Start: 2025-04-02

## 2025-04-16 NOTE — TELEPHONE ENCOUNTER
Requested Prescriptions     Pending Prescriptions Disp Refills    APRISO 0.375 g Oral Capsule SR 24 Hr [Pharmacy Med Name: Apriso 0.375 GM Oral Capsule Extended Release 24 Hour] 120 capsule 0     Sig: TAKE 4 CAPSULES BY MOUTH ONCE DAILY         LOV: 07/17/2024  LR: 01/23/2025  Last Colonoscopy/EGD: 01/23/2025  [facility]

## 2025-04-17 RX ORDER — MESALAMINE 375 MG/1
1.5 CAPSULE, EXTENDED RELEASE ORAL DAILY
Qty: 120 CAPSULE | Refills: 0 | Status: SHIPPED | OUTPATIENT
Start: 2025-04-17

## 2025-04-30 ENCOUNTER — NURSE ONLY (OUTPATIENT)
Age: 85
End: 2025-04-30
Attending: STUDENT IN AN ORGANIZED HEALTH CARE EDUCATION/TRAINING PROGRAM
Payer: MEDICARE

## 2025-04-30 DIAGNOSIS — D64.9 ANEMIA, UNSPECIFIED TYPE: ICD-10-CM

## 2025-04-30 DIAGNOSIS — E61.1 IRON DEFICIENCY: ICD-10-CM

## 2025-04-30 DIAGNOSIS — D72.829 LEUKOCYTOSIS, UNSPECIFIED TYPE: ICD-10-CM

## 2025-04-30 LAB
BASOPHILS # BLD AUTO: 0.05 X10(3) UL (ref 0–0.2)
BASOPHILS NFR BLD AUTO: 0.4 %
DEPRECATED HBV CORE AB SER IA-ACNC: 72 NG/ML (ref 50–336)
DEPRECATED RDW RBC AUTO: 47.3 FL (ref 35.1–46.3)
EOSINOPHIL # BLD AUTO: 0.18 X10(3) UL (ref 0–0.7)
EOSINOPHIL NFR BLD AUTO: 1.4 %
ERYTHROCYTE [DISTWIDTH] IN BLOOD BY AUTOMATED COUNT: 14.5 % (ref 11–15)
HCT VFR BLD AUTO: 34.7 % (ref 39–53)
HGB BLD-MCNC: 10.8 G/DL (ref 13–17.5)
IMM GRANULOCYTES # BLD AUTO: 0.09 X10(3) UL (ref 0–1)
IMM GRANULOCYTES NFR BLD: 0.7 %
IRON SATN MFR SERPL: 14 % (ref 20–50)
IRON SERPL-MCNC: 35 UG/DL (ref 65–175)
LYMPHOCYTES # BLD AUTO: 2 X10(3) UL (ref 1–4)
LYMPHOCYTES NFR BLD AUTO: 15.3 %
MCH RBC QN AUTO: 27.6 PG (ref 26–34)
MCHC RBC AUTO-ENTMCNC: 31.1 G/DL (ref 31–37)
MCV RBC AUTO: 88.5 FL (ref 80–100)
MONOCYTES # BLD AUTO: 1.22 X10(3) UL (ref 0.1–1)
MONOCYTES NFR BLD AUTO: 9.3 %
NEUTROPHILS # BLD AUTO: 9.54 X10 (3) UL (ref 1.5–7.7)
NEUTROPHILS # BLD AUTO: 9.54 X10(3) UL (ref 1.5–7.7)
NEUTROPHILS NFR BLD AUTO: 72.9 %
PLATELET # BLD AUTO: 325 10(3)UL (ref 150–450)
RBC # BLD AUTO: 3.92 X10(6)UL (ref 3.8–5.8)
TOTAL IRON BINDING CAPACITY: 249 UG/DL (ref 250–425)
TRANSFERRIN SERPL-MCNC: 188 MG/DL (ref 215–365)
WBC # BLD AUTO: 13.1 X10(3) UL (ref 4–11)

## 2025-05-01 DIAGNOSIS — F51.01 PRIMARY INSOMNIA: ICD-10-CM

## 2025-05-02 ENCOUNTER — PATIENT MESSAGE (OUTPATIENT)
Dept: INTERNAL MEDICINE CLINIC | Facility: CLINIC | Age: 85
End: 2025-05-02

## 2025-05-02 RX ORDER — TEMAZEPAM 15 MG/1
15 CAPSULE ORAL NIGHTLY PRN
Qty: 90 CAPSULE | Refills: 3 | OUTPATIENT
Start: 2025-05-02

## 2025-05-02 NOTE — TELEPHONE ENCOUNTER
Outpatient Medication Detail     Disp Refills Start End    temazepam 15 MG Oral Cap 90 capsule 3 4/2/2025 --    Sig - Route: Take 1 capsule (15 mg total) by mouth nightly as needed for Sleep. - Oral    Sent to pharmacy as: Temazepam 15 MG Oral Capsule (Restoril)    E-Prescribing Status: Receipt confirmed by pharmacy (4/2/2025 12:07 PM CDT)      Associated Diagnoses    Primary insomnia        Pharmacy    [facility] [address] [place] [address] [phone], [phone]

## 2025-05-06 ENCOUNTER — OFFICE VISIT (OUTPATIENT)
Age: 85
End: 2025-05-06
Attending: STUDENT IN AN ORGANIZED HEALTH CARE EDUCATION/TRAINING PROGRAM
Payer: MEDICARE

## 2025-05-06 VITALS
TEMPERATURE: 98 F | BODY MASS INDEX: 18.73 KG/M2 | WEIGHT: 125 LBS | HEIGHT: 68.5 IN | SYSTOLIC BLOOD PRESSURE: 103 MMHG | HEART RATE: 76 BPM | OXYGEN SATURATION: 97 % | DIASTOLIC BLOOD PRESSURE: 58 MMHG | RESPIRATION RATE: 16 BRPM

## 2025-05-06 DIAGNOSIS — D64.9 ANEMIA, UNSPECIFIED TYPE: Primary | ICD-10-CM

## 2025-05-06 DIAGNOSIS — E61.1 IRON DEFICIENCY: ICD-10-CM

## 2025-05-06 RX ORDER — CYANOCOBALAMIN 1000 UG/ML
1000 INJECTION, SOLUTION INTRAMUSCULAR; SUBCUTANEOUS
Qty: 8 EACH | Refills: 0 | Status: SHIPPED | OUTPATIENT
Start: 2025-05-06 | End: 2025-06-25

## 2025-05-06 RX ORDER — NEEDLES, SAFETY 22GX1 1/2"
NEEDLE, DISPOSABLE MISCELLANEOUS
Qty: 8 EACH | Refills: 0 | Status: SHIPPED | OUTPATIENT
Start: 2025-05-06

## 2025-05-07 NOTE — PROGRESS NOTES
Hematology Cancer center note    5/10/2024    Leukocytosis anemia    Requesting: Dr. Andujar    Hematology History     - [id number]: Mild leukocytosis noted.   2024 noted to have mild anemia with hemoglobin of 10.4, leukocytosis.  Revealed iron deficiency.  Patient received IV iron.      HPI:  84 year old clinic to establish follow-up. Denies any active bleeding.  Infections.  - he mentions that his energy levels have decreased, however not significantly and he still able to perform his activities of daily living.  Patient's  Ulcerative colitis is well-controlled, continues on mesalamine.      Recent Labs   Lab 04/30/25  1129   RBC 3.92   HGB 10.8*   HCT 34.7*   MCV 88.5   MCH 27.6   MCHC 31.1   RDW 14.5   NEPRELIM 9.54*   WBC 13.1*   .0           Past Medical History:    CAD (coronary artery disease)    Stent placement    Colitis    Coronary atherosclerosis    High blood pressure    High cholesterol    Muscle weakness    cane    Pneumonia due to organism    Visual impairment    glasses     Social History     Socioeconomic History    Marital status:    Tobacco Use    Smoking status: Never    Smokeless tobacco: Never   Vaping Use    Vaping status: Never Used   Substance and Sexual Activity    Alcohol use: No    Drug use: No    Sexual activity: Never     Partners: Female     History reviewed. No pertinent family history.  Current Outpatient Medications on File Prior to Visit   Medication Sig Dispense Refill    APRISO 0.375 g Oral Capsule SR 24 Hr TAKE 4 CAPSULES BY MOUTH ONCE DAILY 120 capsule 0    ergocalciferol 1.25 MG (19039 UT) Oral Cap Take 1 capsule (50,000 Units total) by mouth once a week. 12 capsule 3    Losartan Potassium-HCTZ 100-12.5 MG Oral Tab Take 1 tablet by mouth daily. 90 tablet 3    metoprolol succinate ER 25 MG Oral Tablet 24 Hr Take 1 tablet (25 mg total) by mouth daily. 90 tablet 3    rosuvastatin 10 MG Oral Tab Take 1 tablet (10 mg total) by mouth daily. 90 tablet 3    temazepam 15  MG Oral Cap Take 1 capsule (15 mg total) by mouth nightly as needed for Sleep. 90 capsule 3    Mesalamine ER 0.375 g Oral Capsule SR 24 Hr Take 4 capsules (1.5 g total) by mouth daily. 120 capsule 3    clotrimazole-betamethasone 1-0.05 % External Cream Apply 1 Application topically 2 (two) times daily as needed. 15 g 3    Ferrous Sulfate ER 45 MG Oral Tab CR Take 1 tablet by mouth daily. 30 tablet 3    cyanocobalamin 1000 MCG/ML Injection Solution Inject 1 mL (1,000 mcg total) into the muscle every 30 (thirty) days. 1 mL 12    aspirin 81 MG Oral Chew Tab Chew 1 tablet (81 mg total) by mouth in the morning.       No current facility-administered medications on file prior to visit.     Vitals:    05/06/25 1338   BP: 103/58   Pulse: 76   Resp: 16   Temp: 97.7 °F (36.5 °C)     No acute distress alert and oriented regular rate nondistended normal mood no lymphadenopathy no deformity      A/P:  84 year old with cytosis and iron deficiency anemia.    Anemia  - decrease in Hb to 10.8 from 11.9  Noted to have low iron saturation, normal ferritin.  Colonoscopy from Jan 2025: -Ileitis, Diverticulosis and -Internal hemorrhoids  - continue oral iron  - repeat labs in 1-2 months   - if Hb downtrends, we will consider IV iron.   - given poor absorption of Vit b12 with ileitis, Vit B12 injections were prescribed.       -- leukocytosis   -Neutrophilia, along with absolute monocytosis.    -, improving  - likely reactive         Follow up with repeat labs in 2 months    Alyson Eller MD  Hematology oncology

## 2025-06-20 ENCOUNTER — PATIENT MESSAGE (OUTPATIENT)
Facility: CLINIC | Age: 85
End: 2025-06-20

## 2025-06-30 ENCOUNTER — OFFICE VISIT (OUTPATIENT)
Dept: FAMILY MEDICINE CLINIC | Facility: CLINIC | Age: 85
End: 2025-06-30
Payer: COMMERCIAL

## 2025-06-30 ENCOUNTER — LAB ENCOUNTER (OUTPATIENT)
Dept: LAB | Age: 85
End: 2025-06-30
Attending: INTERNAL MEDICINE
Payer: MEDICARE

## 2025-06-30 ENCOUNTER — HOSPITAL ENCOUNTER (OUTPATIENT)
Dept: GENERAL RADIOLOGY | Age: 85
Discharge: HOME | End: 2025-06-30
Payer: MEDICARE

## 2025-06-30 VITALS
OXYGEN SATURATION: 99 % | HEART RATE: 97 BPM | DIASTOLIC BLOOD PRESSURE: 72 MMHG | WEIGHT: 124 LBS | TEMPERATURE: 97 F | BODY MASS INDEX: 19 KG/M2 | RESPIRATION RATE: 18 BRPM | SYSTOLIC BLOOD PRESSURE: 138 MMHG

## 2025-06-30 DIAGNOSIS — J22 LOWER RESPIRATORY INFECTION: Primary | ICD-10-CM

## 2025-06-30 DIAGNOSIS — R05.2 SUBACUTE COUGH: ICD-10-CM

## 2025-06-30 DIAGNOSIS — Z87.01 HISTORY OF PNEUMONIA: ICD-10-CM

## 2025-06-30 DIAGNOSIS — D64.9 ANEMIA, UNSPECIFIED TYPE: ICD-10-CM

## 2025-06-30 DIAGNOSIS — E61.1 IRON DEFICIENCY: ICD-10-CM

## 2025-06-30 LAB
ALBUMIN SERPL-MCNC: 4.2 G/DL (ref 3.2–4.8)
ALBUMIN/GLOB SERPL: 1.6 {RATIO} (ref 1–2)
ALP LIVER SERPL-CCNC: 75 U/L (ref 45–117)
ALT SERPL-CCNC: 19 U/L (ref 10–49)
ANION GAP SERPL CALC-SCNC: 8 MMOL/L (ref 0–18)
AST SERPL-CCNC: 18 U/L (ref ?–34)
BASOPHILS # BLD AUTO: 0.06 X10(3) UL (ref 0–0.2)
BASOPHILS NFR BLD AUTO: 0.4 %
BILIRUB SERPL-MCNC: 0.7 MG/DL (ref 0.2–1.1)
BUN BLD-MCNC: 23 MG/DL (ref 9–23)
CALCIUM BLD-MCNC: 9.1 MG/DL (ref 8.7–10.6)
CHLORIDE SERPL-SCNC: 103 MMOL/L (ref 98–112)
CHOLEST SERPL-MCNC: 115 MG/DL (ref ?–200)
CO2 SERPL-SCNC: 30 MMOL/L (ref 21–32)
CREAT BLD-MCNC: 1.06 MG/DL (ref 0.7–1.3)
DEPRECATED HBV CORE AB SER IA-ACNC: 47 NG/ML (ref 50–336)
EGFRCR SERPLBLD CKD-EPI 2021: 70 ML/MIN/1.73M2 (ref 60–?)
EOSINOPHIL # BLD AUTO: 0.29 X10(3) UL (ref 0–0.7)
EOSINOPHIL NFR BLD AUTO: 2 %
ERYTHROCYTE [DISTWIDTH] IN BLOOD BY AUTOMATED COUNT: 14.6 %
EST. AVERAGE GLUCOSE BLD GHB EST-MCNC: 146 MG/DL (ref 68–126)
FASTING PATIENT LIPID ANSWER: YES
FASTING STATUS PATIENT QL REPORTED: YES
FOLATE SERPL-MCNC: 20.9 NG/ML (ref 5.4–?)
GLOBULIN PLAS-MCNC: 2.6 G/DL (ref 2–3.5)
GLUCOSE BLD-MCNC: 165 MG/DL (ref 70–99)
HAPTOGLOB SERPL-MCNC: 248 MG/DL (ref 30–200)
HBA1C MFR BLD: 6.7 % (ref ?–5.7)
HCT VFR BLD AUTO: 35.5 % (ref 39–53)
HDLC SERPL-MCNC: 49 MG/DL (ref 40–59)
HGB BLD-MCNC: 11.2 G/DL (ref 13–17.5)
IMM GRANULOCYTES # BLD AUTO: 0.46 X10(3) UL (ref 0–1)
IMM GRANULOCYTES NFR BLD: 3.1 %
IRON SATN MFR SERPL: 15 % (ref 20–50)
IRON SERPL-MCNC: 43 UG/DL (ref 65–175)
LDH SERPL L TO P-CCNC: 130 U/L (ref 120–246)
LDLC SERPL CALC-MCNC: 41 MG/DL (ref ?–100)
LYMPHOCYTES # BLD AUTO: 1.59 X10(3) UL (ref 1–4)
LYMPHOCYTES NFR BLD AUTO: 10.8 %
MCH RBC QN AUTO: 28.4 PG (ref 26–34)
MCHC RBC AUTO-ENTMCNC: 31.5 G/DL (ref 31–37)
MCV RBC AUTO: 90.1 FL (ref 80–100)
MONOCYTES # BLD AUTO: 1.35 X10(3) UL (ref 0.1–1)
MONOCYTES NFR BLD AUTO: 9.2 %
NEUTROPHILS # BLD AUTO: 10.98 X10 (3) UL (ref 1.5–7.7)
NEUTROPHILS # BLD AUTO: 10.98 X10(3) UL (ref 1.5–7.7)
NEUTROPHILS NFR BLD AUTO: 74.5 %
NONHDLC SERPL-MCNC: 66 MG/DL (ref ?–130)
OSMOLALITY SERPL CALC.SUM OF ELEC: 299 MOSM/KG (ref 275–295)
PLATELET # BLD AUTO: 327 10(3)UL (ref 150–450)
POTASSIUM SERPL-SCNC: 4 MMOL/L (ref 3.5–5.1)
PROT SERPL-MCNC: 6.8 G/DL (ref 5.7–8.2)
RBC # BLD AUTO: 3.94 X10(6)UL (ref 3.8–5.8)
SODIUM SERPL-SCNC: 141 MMOL/L (ref 136–145)
TOTAL IRON BINDING CAPACITY: 291 UG/DL (ref 250–425)
TRANSFERRIN SERPL-MCNC: 213 MG/DL (ref 215–365)
TRIGL SERPL-MCNC: 151 MG/DL (ref 30–149)
TSI SER-ACNC: 1.41 UIU/ML (ref 0.55–4.78)
VIT B12 SERPL-MCNC: 486 PG/ML (ref 211–911)
VLDLC SERPL CALC-MCNC: 21 MG/DL (ref 0–30)
WBC # BLD AUTO: 14.7 X10(3) UL (ref 4–11)

## 2025-06-30 PROCEDURE — 83550 IRON BINDING TEST: CPT

## 2025-06-30 PROCEDURE — 1160F RVW MEDS BY RX/DR IN RCRD: CPT

## 2025-06-30 PROCEDURE — 80053 COMPREHEN METABOLIC PANEL: CPT | Performed by: INTERNAL MEDICINE

## 2025-06-30 PROCEDURE — 3075F SYST BP GE 130 - 139MM HG: CPT

## 2025-06-30 PROCEDURE — 82668 ASSAY OF ERYTHROPOIETIN: CPT

## 2025-06-30 PROCEDURE — 83010 ASSAY OF HAPTOGLOBIN QUANT: CPT

## 2025-06-30 PROCEDURE — 99214 OFFICE O/P EST MOD 30 MIN: CPT

## 2025-06-30 PROCEDURE — 85025 COMPLETE CBC W/AUTO DIFF WBC: CPT

## 2025-06-30 PROCEDURE — 80061 LIPID PANEL: CPT | Performed by: INTERNAL MEDICINE

## 2025-06-30 PROCEDURE — 36415 COLL VENOUS BLD VENIPUNCTURE: CPT

## 2025-06-30 PROCEDURE — 82746 ASSAY OF FOLIC ACID SERUM: CPT

## 2025-06-30 PROCEDURE — 83540 ASSAY OF IRON: CPT

## 2025-06-30 PROCEDURE — 82607 VITAMIN B-12: CPT

## 2025-06-30 PROCEDURE — 83036 HEMOGLOBIN GLYCOSYLATED A1C: CPT | Performed by: INTERNAL MEDICINE

## 2025-06-30 PROCEDURE — 1159F MED LIST DOCD IN RCRD: CPT

## 2025-06-30 PROCEDURE — 83615 LACTATE (LD) (LDH) ENZYME: CPT

## 2025-06-30 PROCEDURE — 84443 ASSAY THYROID STIM HORMONE: CPT | Performed by: INTERNAL MEDICINE

## 2025-06-30 PROCEDURE — 3078F DIAST BP <80 MM HG: CPT

## 2025-06-30 PROCEDURE — 71046 X-RAY EXAM CHEST 2 VIEWS: CPT

## 2025-06-30 PROCEDURE — 82728 ASSAY OF FERRITIN: CPT

## 2025-06-30 RX ORDER — CLOTRIMAZOLE AND BETAMETHASONE DIPROPIONATE 10; .64 MG/G; MG/G
1 CREAM TOPICAL 2 TIMES DAILY PRN
Qty: 15 G | Refills: 3 | OUTPATIENT
Start: 2025-06-30

## 2025-06-30 RX ORDER — DOXYCYCLINE HYCLATE 100 MG
100 TABLET ORAL 2 TIMES DAILY
Qty: 14 TABLET | Refills: 0 | Status: SHIPPED | OUTPATIENT
Start: 2025-06-30 | End: 2025-07-07

## 2025-06-30 NOTE — PROGRESS NOTES
Subjective:   Patient ID: Prasad Dennis is a 84 year old male.    Patient presents to clinic with complaints of cough for 3 weeks. Denies fever. Reports that he has treated the cough with azithromycin, steroids, inhaler and tessalon pearls. Denies known exposures. Reports history of pneumonia requiring hospitalization about 5 years ago and history of asthma. Reports using inhaler about 3 times a day. Denies difficulty breathing or chest pain. Reports that with activity with have some shortness of breath but that resolves with rest.    Cough  This is a new problem. The current episode started 1 to 4 weeks ago. The problem has been unchanged. The problem occurs every few hours. The cough is Productive of sputum. Pertinent negatives include no chest pain, chills, fever, sore throat, shortness of breath, sweats, weight loss or wheezing. He has tried oral steroids for the symptoms.       History/Other:   Review of Systems   Constitutional:  Negative for chills, fever and weight loss.   HENT:  Negative for sore throat.    Respiratory:  Positive for cough. Negative for shortness of breath and wheezing.    Cardiovascular:  Negative for chest pain.   All other systems reviewed and are negative.    Current Medications[1]  Allergies:Allergies[2]    Objective:   Physical Exam  Vitals reviewed.   Constitutional:       General: He is not in acute distress.     Appearance: Normal appearance. He is not ill-appearing or toxic-appearing.   HENT:      Head: Normocephalic and atraumatic.      Right Ear: Tympanic membrane, ear canal and external ear normal.      Left Ear: Tympanic membrane, ear canal and external ear normal.      Nose: Nose normal.      Mouth/Throat:      Mouth: Mucous membranes are moist.      Pharynx: Oropharynx is clear.   Cardiovascular:      Rate and Rhythm: Normal rate and regular rhythm.      Pulses: Normal pulses.      Heart sounds: Normal heart sounds.   Pulmonary:      Effort: Pulmonary effort is normal. No  respiratory distress.      Breath sounds: No decreased air movement or transmitted upper airway sounds. Examination of the right-lower field reveals decreased breath sounds. Decreased breath sounds present. No wheezing, rhonchi or rales.      Comments: Productive cough during exam  Musculoskeletal:         General: Normal range of motion.      Cervical back: Normal range of motion and neck supple.   Lymphadenopathy:      Cervical: No cervical adenopathy.   Skin:     General: Skin is warm and dry.      Capillary Refill: Capillary refill takes less than 2 seconds.   Neurological:      General: No focal deficit present.      Mental Status: He is alert and oriented to person, place, and time.   Psychiatric:         Mood and Affect: Mood normal.         Behavior: Behavior normal.         Assessment & Plan:   1. Lower respiratory infection    2. Subacute cough    3. History of pneumonia        Chest x-ray ordered and informed of results. Discussion about supportive treatment including use of inhaler and mucinex. Follow up with PCP if symptoms continue or go to ER if they worsen.    FINDINGS: The heart is normal in size. The lungs are clear of active--appearing disease process. The costophrenic angles are sharp. There is no active disease seen.      Impression   CONCLUSION:  No active disease seen.    Electronically Verified and Signed by Attending Radiologist: Isaiah Stoner MD 6/30/2025 11:46 AM         Meds This Visit:  Requested Prescriptions     Signed Prescriptions Disp Refills    Doxycycline Hyclate 100 MG Oral Tab 14 tablet 0     Sig: Take 1 tablet (100 mg total) by mouth 2 (two) times daily for 7 days.       Imaging & Referrals:  None         [1]   Current Outpatient Medications   Medication Sig Dispense Refill    Doxycycline Hyclate 100 MG Oral Tab Take 1 tablet (100 mg total) by mouth 2 (two) times daily for 7 days. 14 tablet 0    APRISO 0.375 g Oral Capsule SR 24 Hr Take 4 capsules (1.5 g total) by mouth daily.  120 capsule 5    Syringe/Needle, Disp, (BD SYRINGE/NEEDLE) 25G X 5/8\" 1 ML Does not apply Misc Use to draw 1 ml of Vit b12 and inject subcutaneously 8 each 0    ergocalciferol 1.25 MG (49239 UT) Oral Cap Take 1 capsule (50,000 Units total) by mouth once a week. 12 capsule 3    Losartan Potassium-HCTZ 100-12.5 MG Oral Tab Take 1 tablet by mouth daily. 90 tablet 3    metoprolol succinate ER 25 MG Oral Tablet 24 Hr Take 1 tablet (25 mg total) by mouth daily. 90 tablet 3    rosuvastatin 10 MG Oral Tab Take 1 tablet (10 mg total) by mouth daily. 90 tablet 3    temazepam 15 MG Oral Cap Take 1 capsule (15 mg total) by mouth nightly as needed for Sleep. 90 capsule 3    Mesalamine ER 0.375 g Oral Capsule SR 24 Hr Take 4 capsules (1.5 g total) by mouth daily. 120 capsule 3    clotrimazole-betamethasone 1-0.05 % External Cream Apply 1 Application topically 2 (two) times daily as needed. 15 g 3    Ferrous Sulfate ER 45 MG Oral Tab CR Take 1 tablet by mouth daily. 30 tablet 3    cyanocobalamin 1000 MCG/ML Injection Solution Inject 1 mL (1,000 mcg total) into the muscle every 30 (thirty) days. 1 mL 12    aspirin 81 MG Oral Chew Tab Chew 1 tablet (81 mg total) by mouth in the morning.     [2]   Allergies  Allergen Reactions    Codeine OTHER (SEE COMMENTS)     tachycardia    Gadolinium Derivatives FATIGUE     Felt hot when getting micheal injection with last MRI scan in 2023

## 2025-07-01 LAB — ERYTHROPOIETIN: 7.3 MIU/ML

## 2025-07-08 ENCOUNTER — OFFICE VISIT (OUTPATIENT)
Age: 85
End: 2025-07-08
Attending: STUDENT IN AN ORGANIZED HEALTH CARE EDUCATION/TRAINING PROGRAM
Payer: MEDICARE

## 2025-07-08 VITALS
DIASTOLIC BLOOD PRESSURE: 53 MMHG | HEIGHT: 68.5 IN | SYSTOLIC BLOOD PRESSURE: 137 MMHG | TEMPERATURE: 98 F | OXYGEN SATURATION: 96 % | RESPIRATION RATE: 16 BRPM | WEIGHT: 124 LBS | HEART RATE: 76 BPM | BODY MASS INDEX: 18.58 KG/M2

## 2025-07-08 DIAGNOSIS — K52.9 ILEITIS: ICD-10-CM

## 2025-07-08 DIAGNOSIS — D72.829 LEUKOCYTOSIS, UNSPECIFIED TYPE: ICD-10-CM

## 2025-07-08 DIAGNOSIS — D64.9 ANEMIA, UNSPECIFIED TYPE: Primary | ICD-10-CM

## 2025-07-08 DIAGNOSIS — D72.821 MONOCYTOSIS: ICD-10-CM

## 2025-07-08 DIAGNOSIS — E61.1 IRON DEFICIENCY: ICD-10-CM

## 2025-07-08 RX ORDER — FERROUS SULFATE 325(65) MG
325 TABLET ORAL
Qty: 30 TABLET | Refills: 3 | Status: SHIPPED | OUTPATIENT
Start: 2025-07-08

## 2025-07-08 RX ORDER — CEFDINIR 300 MG/1
300 CAPSULE ORAL 2 TIMES DAILY
COMMUNITY
Start: 2025-06-30

## 2025-07-08 NOTE — PROGRESS NOTES
Hematology Cancer center note    5/10/2024    Leukocytosis anemia    Requesting: Dr. Andujar    Hematology History     - [id number]: Mild leukocytosis noted.   2024 noted to have mild anemia with hemoglobin of 10.4, leukocytosis.  Revealed iron deficiency.  Patient received IV iron.      HPI:  84 year old clinic to establish follow-up.  He recently was diagnosed with pneumonia symptoms started around a month ago, with worsening cough which is more productive, and intense tiredness.  Patient mentions that he took azithromycin, steroids along with inhalers and cefazolin with improvement in his symptoms.      Denies having any abdominal pain, active bleeding.  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    He remains on mesalamine, symptoms are well-controlled.  No bloody stools.    No results for input(s): \"RBC\", \"HGB\", \"HCT\", \"MCV\", \"MCH\", \"MCHC\", \"RDW\", \"NEPRELIM\", \"WBC\", \"PLT\" in the last 168 hours.          Past Medical History:    CAD (coronary artery disease)    Stent placement    Colitis    Coronary atherosclerosis    High blood pressure    High cholesterol    Muscle weakness    cane    Pneumonia due to organism    Visual impairment    glasses     Social History     Socioeconomic History    Marital status:    Tobacco Use    Smoking status: Never    Smokeless tobacco: Never   Vaping Use    Vaping status: Never Used   Substance and Sexual Activity    Alcohol use: No    Drug use: No    Sexual activity: Never     Partners: Female     No family history on file.  Current Outpatient Medications on File Prior to Visit   Medication Sig Dispense Refill    cefdinir 300 MG Oral Cap Take 1 capsule (300 mg total) by mouth 2 (two) times daily.      Doxycycline Hyclate 100 MG Oral Tab Take 1 tablet (100 mg total) by mouth 2 (two) times daily for 7 days. 14 tablet 0    APRISO 0.375 g Oral Capsule SR 24 Hr Take 4 capsules (1.5 g total) by mouth daily. 120 capsule 5    cyanocobalamin 1000 MCG/ML Injection  Solution Inject 1 mL (1,000 mcg total) into the muscle every 7 days for 8 doses. 8 each 0    Syringe/Needle, Disp, (BD SYRINGE/NEEDLE) 25G X 5/8\" 1 ML Does not apply Misc Use to draw 1 ml of Vit b12 and inject subcutaneously 8 each 0    ergocalciferol 1.25 MG (00087 UT) Oral Cap Take 1 capsule (50,000 Units total) by mouth once a week. 12 capsule 3    Losartan Potassium-HCTZ 100-12.5 MG Oral Tab Take 1 tablet by mouth daily. 90 tablet 3    metoprolol succinate ER 25 MG Oral Tablet 24 Hr Take 1 tablet (25 mg total) by mouth daily. 90 tablet 3    rosuvastatin 10 MG Oral Tab Take 1 tablet (10 mg total) by mouth daily. 90 tablet 3    temazepam 15 MG Oral Cap Take 1 capsule (15 mg total) by mouth nightly as needed for Sleep. 90 capsule 3    Mesalamine ER 0.375 g Oral Capsule SR 24 Hr Take 4 capsules (1.5 g total) by mouth daily. 120 capsule 3    clotrimazole-betamethasone 1-0.05 % External Cream Apply 1 Application topically 2 (two) times daily as needed. 15 g 3    Ferrous Sulfate ER 45 MG Oral Tab CR Take 1 tablet by mouth daily. 30 tablet 3    cyanocobalamin 1000 MCG/ML Injection Solution Inject 1 mL (1,000 mcg total) into the muscle every 30 (thirty) days. 1 mL 12    aspirin 81 MG Oral Chew Tab Chew 1 tablet (81 mg total) by mouth in the morning.       No current facility-administered medications on file prior to visit.     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    Anemia  Stable   Noted to have low iron saturation, normal ferritin inspite of taking oral iron every day.  Colonoscopy from Jan 2025: -Ileitis, Diverticulosis and -Internal hemorrhoids in  - continue oral iron  - we will schedule for IV Iron  - iron studies consistent with iron deficiency  - schedule 1000mg IV iron dextran, close  monitoring in infusion for reaction  - repeat CBC, iron, tibc, ferritin in 3 months to reassess iron stores    - given poor absorption of Vit b12 with ileitis, Vit B12 injections were prescribed.       -- leukocytosis   -Neutrophilia, along with absolute monocytosis.    - Likely reactive from his recent infection    Follow up in 3 months with repeat labs     Alyson Eller MD  Hematology oncology

## 2025-08-13 ENCOUNTER — OFFICE VISIT (OUTPATIENT)
Facility: LOCATION | Age: 85
End: 2025-08-13
Attending: STUDENT IN AN ORGANIZED HEALTH CARE EDUCATION/TRAINING PROGRAM

## 2025-08-13 VITALS
WEIGHT: 124.69 LBS | DIASTOLIC BLOOD PRESSURE: 73 MMHG | BODY MASS INDEX: 19 KG/M2 | OXYGEN SATURATION: 97 % | RESPIRATION RATE: 18 BRPM | SYSTOLIC BLOOD PRESSURE: 122 MMHG | TEMPERATURE: 98 F | HEART RATE: 67 BPM

## 2025-08-13 DIAGNOSIS — D50.9 IRON DEFICIENCY ANEMIA, UNSPECIFIED IRON DEFICIENCY ANEMIA TYPE: Primary | ICD-10-CM

## 2025-08-15 ENCOUNTER — TELEPHONE (OUTPATIENT)
Facility: CLINIC | Age: 85
End: 2025-08-15

## 2025-08-15 ENCOUNTER — OFFICE VISIT (OUTPATIENT)
Facility: CLINIC | Age: 85
End: 2025-08-15

## 2025-08-15 VITALS
RESPIRATION RATE: 16 BRPM | BODY MASS INDEX: 18.37 KG/M2 | OXYGEN SATURATION: 100 % | HEIGHT: 69 IN | HEART RATE: 82 BPM | DIASTOLIC BLOOD PRESSURE: 52 MMHG | WEIGHT: 124 LBS | SYSTOLIC BLOOD PRESSURE: 104 MMHG

## 2025-08-15 DIAGNOSIS — J20.9 ACUTE BRONCHITIS, UNSPECIFIED ORGANISM: ICD-10-CM

## 2025-08-15 DIAGNOSIS — J41.1 MUCOPURULENT CHRONIC BRONCHITIS (HCC): Primary | ICD-10-CM

## 2025-08-15 DIAGNOSIS — J44.1 COPD WITH ACUTE EXACERBATION (HCC): Primary | ICD-10-CM

## 2025-08-15 DIAGNOSIS — J43.2 CENTRILOBULAR EMPHYSEMA (HCC): ICD-10-CM

## 2025-08-15 DIAGNOSIS — J44.1 COPD WITH ACUTE EXACERBATION (HCC): ICD-10-CM

## 2025-08-15 PROCEDURE — 3008F BODY MASS INDEX DOCD: CPT | Performed by: HOSPITALIST

## 2025-08-15 PROCEDURE — 1159F MED LIST DOCD IN RCRD: CPT | Performed by: HOSPITALIST

## 2025-08-15 PROCEDURE — 99204 OFFICE O/P NEW MOD 45 MIN: CPT | Performed by: HOSPITALIST

## 2025-08-15 PROCEDURE — 3074F SYST BP LT 130 MM HG: CPT | Performed by: HOSPITALIST

## 2025-08-15 PROCEDURE — 3078F DIAST BP <80 MM HG: CPT | Performed by: HOSPITALIST

## 2025-08-15 PROCEDURE — 1160F RVW MEDS BY RX/DR IN RCRD: CPT | Performed by: HOSPITALIST

## 2025-08-15 RX ORDER — FLUTICASONE FUROATE, UMECLIDINIUM BROMIDE AND VILANTEROL TRIFENATATE 100; 62.5; 25 UG/1; UG/1; UG/1
1 POWDER RESPIRATORY (INHALATION) DAILY
Qty: 3 EACH | Refills: 3 | Status: SHIPPED | OUTPATIENT
Start: 2025-08-15

## 2025-08-15 RX ORDER — AZITHROMYCIN 250 MG/1
TABLET, FILM COATED ORAL
Qty: 6 TABLET | Refills: 0 | Status: SHIPPED | OUTPATIENT
Start: 2025-08-15

## 2025-08-15 RX ORDER — ALBUTEROL SULFATE 90 UG/1
2 INHALANT RESPIRATORY (INHALATION) EVERY 4 HOURS PRN
COMMUNITY
Start: 2025-06-21

## 2025-08-15 RX ORDER — FINASTERIDE 5 MG/1
5 TABLET, FILM COATED ORAL DAILY
COMMUNITY
Start: 2025-04-29

## 2025-08-15 RX ORDER — IPRATROPIUM BROMIDE AND ALBUTEROL SULFATE 2.5; .5 MG/3ML; MG/3ML
3 SOLUTION RESPIRATORY (INHALATION) EVERY 4 HOURS PRN
Qty: 360 EACH | Refills: 2 | Status: SHIPPED | OUTPATIENT
Start: 2025-08-15

## 2025-08-15 RX ORDER — FLUTICASONE PROPIONATE AND SALMETEROL 250; 50 UG/1; UG/1
1 POWDER RESPIRATORY (INHALATION) 2 TIMES DAILY
Qty: 1 EACH | Refills: 5 | Status: SHIPPED | OUTPATIENT
Start: 2025-08-15 | End: 2025-08-15

## 2025-08-15 RX ORDER — SYRINGE WITH NEEDLE, 1 ML 25GX5/8"
SYRINGE, EMPTY DISPOSABLE MISCELLANEOUS
COMMUNITY
Start: 2025-05-07

## 2025-08-15 RX ORDER — DOXYCYCLINE HYCLATE 50 MG/1
1 CAPSULE, GELATIN COATED ORAL DAILY
COMMUNITY
Start: 2025-04-06

## 2025-08-15 RX ORDER — BENZONATATE 100 MG/1
100 CAPSULE ORAL 3 TIMES DAILY PRN
COMMUNITY
Start: 2025-06-25

## 2025-08-15 RX ORDER — METHYLPREDNISOLONE 4 MG/1
4 TABLET ORAL DAILY
Qty: 21 TABLET | Refills: 0 | Status: SHIPPED | OUTPATIENT
Start: 2025-08-15

## 2025-08-20 DIAGNOSIS — D50.9 IRON DEFICIENCY ANEMIA, UNSPECIFIED IRON DEFICIENCY ANEMIA TYPE: Primary | ICD-10-CM

## 2025-08-20 RX ORDER — DOXYCYCLINE HYCLATE 50 MG/1
325 CAPSULE, GELATIN COATED ORAL
Qty: 30 TABLET | Refills: 2 | Status: SHIPPED | OUTPATIENT
Start: 2025-08-20

## (undated) DIAGNOSIS — R35.0 URINARY FREQUENCY: Primary | ICD-10-CM

## (undated) DIAGNOSIS — K51.20 ULCERATIVE PROCTITIS WITHOUT COMPLICATION (HCC): Primary | ICD-10-CM

## (undated) DIAGNOSIS — K86.2 PANCREATIC CYST: ICD-10-CM

## (undated) DEVICE — FORCEP RADIAL JAW 4

## (undated) DEVICE — GIJAW SINGLE-USE BIOPSY FORCEPS WITH NEEDLE: Brand: GIJAW

## (undated) DEVICE — MEDI-VAC NON-CONDUCTIVE SUCTION TUBING 6MM X 1.8M (6FT.) L: Brand: CARDINAL HEALTH

## (undated) DEVICE — 35 ML SYRINGE REGULAR TIP: Brand: MONOJECT

## (undated) DEVICE — Device

## (undated) DEVICE — Device: Brand: CUSTOM PROCEDURE KIT

## (undated) DEVICE — KIT CLEAN ENDOKIT 1.1OZ GOWNX2

## (undated) DEVICE — 60 ML SYRINGE REGULAR TIP: Brand: MONOJECT

## (undated) DEVICE — Device: Brand: DEFENDO AIR/WATER/SUCTION AND BIOPSY VALVE

## (undated) DEVICE — LINE MNTR ADLT SET O2 INTMD

## (undated) DEVICE — V2 SPECIMEN COLLECTION MANIFOLD KIT: Brand: NEPTUNE

## (undated) DEVICE — KIT ENDO ORCAPOD 160/180/190

## (undated) NOTE — LETTER
Patient Name: Desiree Santana  : 1940  MRN: [id number]  Patient Address: [address] [place] [address]      Coronavirus Disease 2019 (COVID-19)     [facility] is committed to the safety and well-being of our patients, member carefully. If your symptoms get worse, call your healthcare provider immediately. 3. Get rest and stay hydrated.    4. If you have a medical appointment, call the healthcare provider ahead of time and tell them that you have or may have COVID-19.  5. For m of fever-reducing medications; and  · Improvement in respiratory symptoms (e.g., cough, shortness of breath); and  · At least 10 days have passed since symptoms first appeared OR if asymptomatic patient or date of symptom onset is unclear then use 10 days donors must:    · Have had a confirmed diagnosis of COVID-19  · Be symptom-free for at least 14 days*    *Some people will be required to have a repeat COVID-19 test in order to be eligible to donate.  If you’re instructed by Deloris that a repeat test is r random. Researchers are trying to identify similarities between people with a Post-COVID condition to better understand if there are risk factors. How do I prevent a Post-COVID condition?   The best way to prevent the long-term symptoms of COVID-19 is

## (undated) NOTE — LETTER
7/7/2021    Prasad Dennis        [address]            Dear Anatoliy Zheng,      Our records indicate that you are due for an appointment for a Colonoscopy in September 2021, or shortly there after, with Isac Wilde,

## (undated) NOTE — LETTER
[facility]  [address], [place], [place]    Authorization for Surgical Operation and Procedure                               I hereby authorize Basil Griffin MD, my physician and his/her assistants (if applicable), which may include medical students, residents, and/or fellows, to perform the following surgical operation/ procedure and administer such anesthesia as may be determined necessary by my physician: Operation/Procedure name (s) COLONOSCOPY on Prasad Dennis   2.   I recognize that during the surgical operation/procedure, unforeseen conditions may necessitate additional or different procedures than those listed above.  I, therefore, further authorize and request that the above-named surgeon, assistants, or designees perform such procedures as are, in their judgment, necessary and desirable.    3.   My surgeon/physician has discussed prior to my surgery the potential benefits, risks and side effects of this procedure; the likelihood of achieving goals; and potential problems that might occur during recuperation.  They also discussed reasonable alternatives to the procedure, including risks, benefits, and side effects related to the alternatives and risks related to not receiving this procedure.  I have had all my questions answered and I acknowledge that no guarantee has been made as to the result that may be obtained.    4.   Should the need arise during my operation/procedure, which includes change of level of care prior to discharge, I also consent to the administration of blood and/or blood products.  Further, I understand that despite careful testing and screening of blood or blood products by collecting agencies, I may still be subject to ill effects as a result of receiving a blood transfusion and/or blood products.  The following are some, but not all, of the potential risks that can occur: fever and allergic reactions, hemolytic reactions, transmission of diseases such as  Hepatitis, AIDS and Cytomegalovirus (CMV) and fluid overload.  In the event that I wish to have an autologous transfusion of my own blood, or a directed donor transfusion, I will discuss this with my physician.  Check only if Refusing Blood or Blood Products  I understand refusal of blood or blood products as deemed necessary by my physician may have serious consequences to my condition to include possible death. I hereby assume responsibility for my refusal and release the hospital, its personnel, and my physicians from any responsibility for the consequences of my refusal.    o  Refuse   5.   I authorize the use of any specimen, organs, tissues, body parts or foreign objects that may be removed from my body during the operation/procedure for diagnosis, research or teaching purposes and their subsequent disposal by hospital authorities.  I also authorize the release of specimen test results and/or written reports to my treating physician on the hospital medical staff or other referring or consulting physicians involved in my care, at the discretion of the Pathologist or my treating physician.    6.   I consent to the photographing or videotaping of the operations or procedures to be performed, including appropriate portions of my body for medical, scientific, or educational purposes, provided my identity is not revealed by the pictures or by descriptive texts accompanying them.  If the procedure has been photographed/videotaped, the surgeon will obtain the original picture, image, videotape or CD.  The hospital will not be responsible for storage, release or maintenance of the picture, image, tape or CD.    7.   I consent to the presence of a  or observers in the operating room as deemed necessary by my physician or their designees.    8.   I recognize that in the event my procedure results in extended X-Ray/fluoroscopy time, I may develop a skin reaction.    9. If I have a Do Not Attempt  Resuscitation (DNAR) order in place, that status will be suspended while in the operating room, procedural suite, and during the recovery period unless otherwise explicitly stated by me (or a person authorized to consent on my behalf). The surgeon or my attending physician will determine when the applicable recovery period ends for purposes of reinstating the DNAR order.  10. Patients having a sterilization procedure: I understand that if the procedure is successful the results will be permanent and it will therefore be impossible for me to inseminate, conceive, or bear children.  I also understand that the procedure is intended to result in sterility, although the result has not been guaranteed.   11. I acknowledge that my physician has explained sedation/analgesia administration to me including the risk and benefits I consent to the administration of sedation/analgesia as may be necessary or desirable in the judgment of my physician.    I CERTIFY THAT I HAVE READ AND FULLY UNDERSTAND THE ABOVE CONSENT TO OPERATION and/or OTHER PROCEDURE.     ____________________________________  _________________________________        ______________________________  Signature of Patient    Signature of Responsible Person                Printed Name of Responsible Person                                      ____________________________________  _____________________________                ________________________________  Signature of Witness        Date  Time         Relationship to Patient    STATEMENT OF PHYSICIAN My signature below affirms that prior to the time of the procedure; I have explained to the patient and/or his/her legal representative, the risks and benefits involved in the proposed treatment and any reasonable alternative to the proposed treatment. I have also explained the risks and benefits involved in refusal of the proposed treatment and alternatives to the proposed treatment and have answered the patient's  questions. If I have a significant financial interest in a co-management agreement or a significant financial interest in any product or implant, or other significant relationship used in this procedure/surgery, I have disclosed this and had a discussion with my patient.     _____________________________________________________              _____________________________  (Signature of Physician)                                                                                         (Date)                                   (Time)  Patient Name: Prasad Dennis      : 1940      Printed: 2025     Medical Record #: [id number]                                      Page 1 of 1

## (undated) NOTE — LETTER
6/19/2020              [address]         Dear Tavo Rahman,    This letter is to inform you that you are due for a repeat MRI of the pancreas.   An order was already placed in our system for this exam.

## (undated) NOTE — LETTER
8/13/2020              [address]ad [address] [address]                                                                                                                    Dear Vista Gitelman,     This letter is to inform you th

## (undated) NOTE — ED AVS SNAPSHOT
[facility]   MRN: [id number]    Department:  [facility] Emergency Department   Date of Visit:  1/1/2019           Disclosure     Insurance plans vary and the physician(s) referred by the ER may not be covered by your plan.  Please contact your within the next three months to obtain basic health screening including reassessment of your blood pressure.     IF THERE IS ANY CHANGE OR WORSENING OF YOUR CONDITION, CALL YOUR PRIMARY CARE PHYSICIAN AT ONCE OR RETURN IMMEDIATELY TO THE EMERGENCY DEPARTMEN

## (undated) NOTE — LETTER
3/15/2023              Prasad Dennis        [address]         Dear Blossom Pan,    [facility] records indicate that the tests ordered for you by Page Talavera MD  have not been done. If you have, in fact, already completed the tests or you do not wish to have the tests done, please contact our office at [address] [place] [address]. Otherwise, please proceed with the testing. Enclosed is a duplicate order for your convenience. Please call Central Scheduling at [phone] to schedule this test order:           MRI ABDOMEN (W+WO) ([id number])    Sincerely,    Page Talavera MD  [facility], [address] [address] [phone]

## (undated) NOTE — LETTER
01/09/20        [address]      Dear Dorcas Barker,    [address] records indicate that you have outstanding lab work and or testing that was ordered for you and has not yet been completed:  Orders Placed This Encounter

## (undated) NOTE — LETTER
[facility], [place]  Authorization for Invasive Procedures  1.  I hereby authorize Dr. Jeimy Nassar , my physician and whomever may be designated as the doctor's assistant, to perform the following operation and/or procedure:  Colonoscop performed for the purposes of advancing medicine, science, and/or education, provided my identity is not revealed. If the procedure has been videotaped, the physician/surgeon will obtain the original videotape.  The hospital will not be responsible for stor My signature below affirms that prior to the time of the procedure, I have explained to the patient and/or his legal representative, the risks and benefits involved in the proposed treatment and any reasonable alternative to the proposed treatment.  I have

## (undated) NOTE — LETTER
[facility]  Administration of Anesthesia  Prasad ALONSO agree to be cared for by a physician anesthesiologist alone and/or with a nurse anesthetist, who is specially trained to monitor me and give me medicine to put me to sleep or keep me comfortable during my procedure    I understand that my anesthesiologist and/or anesthetist is not an employee or agent of [facility] or [employer]. He or she works for [facility]    As the patient asking for anesthesia services, I agree to:  Allow the anesthesiologist (anesthesia doctor) to give me medicine and do additional procedures as necessary. Some examples are: Starting or using an “IV” to give me medicine, fluids or blood during my procedure, and having a breathing tube placed to help me breathe when I’m asleep (intubation). In the event that my heart stops working properly, I understand that my anesthesiologist will make every effort to sustain my life, unless otherwise directed by [facility] Do Not Resuscitate documents.  Tell my anesthesia doctor before my procedure:  If I am pregnant.  The last time that I ate or drank.  iii. All of the medicines I take (including prescriptions, herbal supplements, and pills I can buy without a prescription (including street drugs/illegal medications). Failure to inform my anesthesiologist about these medicines may increase my risk of anesthetic complications.  iv.If I am allergic to anything or have had a reaction to anesthesia before.  I understand how the anesthesia medicine will help me (benefits).  I understand that with any type of anesthesia medicine there are risks:  The most common risks are: nausea, vomiting, sore throat, muscle soreness, damage to my eyes, mouth, or teeth (from breathing tube placement).  Rare risks include: remembering what happened during my procedure, allergic reactions to medications, injury to my airway, heart, lungs, vision, nerves, or  muscles and in extremely rare instances death.  My doctor has explained to me other choices available to me for my care (alternatives).  Pregnant Patients (“epidural”):  I understand that the risks of having an epidural (medicine given into my back to help control pain during labor), include itching, low blood pressure, difficulty urinating, headache or slowing of the baby’s heart. Very rare risks include infection, bleeding, seizure, irregular heart rhythms and nerve injury.  Regional Anesthesia (“spinal”, “epidural”, & “nerve blocks”):  I understand that rare but potential complications include headache, bleeding, infection, seizure, irregular heart rhythms, and nerve injury.    _____________________________________________________________________________  Patient (or Representative) Signature/Relationship to Patient  Date   Time    _____________________________________________________________________________   Name (if used)    Language/Organization   Time    _____________________________________________________________________________  Nurse Anesthetist Signature     Date   Time  _____________________________________________________________________________  Anesthesiologist Signature     Date   Time  I have discussed the procedure and information above with the patient (or patient’s representative) and answered their questions. The patient or their representative has agreed to have anesthesia services.    _____________________________________________________________________________  Witness        Date   Time  I have verified that the signature is that of the patient or patient’s representative, and that it was signed before the procedure  Patient Name: Prasad Dennis     : 1940                 Printed: 2025 at 9:19 AM    Medical Record #: [id number]                                            Page 1 of 1  ----------ANESTHESIA CONSENT----------

## (undated) NOTE — LETTER
[facility]  Administration of Anesthesia  1. I, Wilda Diaz, or _________________________________ acting on his behalf, (Patient) (Dependent/Representative) request to receive anesthesia for my pending procedure/operation/treatment.   DONALDO aguilar infections, high spinal block, spinal bleeding, seizure, cardiac arrest and death. 7. AWARENESS: I understand that it is possible (but unlikely) to have explicit memory of events from the operating room while under general anesthesia.   8. ELECTROCONVULSIV unconscious pt /Relationship    My signature below affirms that prior to the time of the procedure, I have explained to the patient and/or his/her guardian, the risks and benefits of undergoing anesthesia, as well as any reasonable alternatives.     _______

## (undated) NOTE — LETTER
05/23/22        [address]asad [address]      Dear Brionna Salazar records indicate that you have outstanding lab work and or testing that was ordered for you and has not yet been completed:   MRI ABDOMEN (W+WO) ([id number])  Please call Central scheduling at [phone] to schedule this test order    To provide you with the best possible care, please complete these orders at your earliest convenience. If you have recently completed these orders please disregard this letter.      If you have any questions please call the office at [phone]    Thank you,    Kennedy Gonzalez